# Patient Record
Sex: FEMALE | Race: WHITE | NOT HISPANIC OR LATINO | Employment: FULL TIME
[De-identification: names, ages, dates, MRNs, and addresses within clinical notes are randomized per-mention and may not be internally consistent; named-entity substitution may affect disease eponyms.]

---

## 2017-07-01 ENCOUNTER — HEALTH MAINTENANCE LETTER (OUTPATIENT)
Age: 37
End: 2017-07-01

## 2017-09-21 ENCOUNTER — OFFICE VISIT (OUTPATIENT)
Dept: URGENT CARE | Facility: RETAIL CLINIC | Age: 37
End: 2017-09-21
Payer: COMMERCIAL

## 2017-09-21 VITALS — SYSTOLIC BLOOD PRESSURE: 121 MMHG | HEART RATE: 76 BPM | DIASTOLIC BLOOD PRESSURE: 87 MMHG | TEMPERATURE: 98.7 F

## 2017-09-21 DIAGNOSIS — H66.001 ACUTE SUPPURATIVE OTITIS MEDIA OF RIGHT EAR WITHOUT SPONTANEOUS RUPTURE OF TYMPANIC MEMBRANE, RECURRENCE NOT SPECIFIED: Primary | ICD-10-CM

## 2017-09-21 PROCEDURE — 99213 OFFICE O/P EST LOW 20 MIN: CPT | Performed by: PHYSICIAN ASSISTANT

## 2017-09-21 NOTE — MR AVS SNAPSHOT
After Visit Summary   9/21/2017    Brittney Way    MRN: [id number]           Patient Information     Date Of Birth          1980        Visit Information        Provider Department      9/21/2017 5:10 PM Siobhan Gaona PA-C [facility]        Today's Diagnoses     Acute suppurative otitis media of right ear without spontaneous rupture of tympanic membrane, recurrence not specified    -  1      Care Instructions    Take antibiotic as directed- Augmentin twice daily for 10 days.  Take Tylenol or an NSAID such as ibuprofen or naproxen as needed for pain.  May take up to 1000 mg of Tylenol every 6-8 hours- do not exceed 4000 mg in 24 hours.  May take up to 600 mg ibuprofen every 6-8 hours.  Alternate Tylenol and Ibuprofen every 4 hours.  Warm compresses next to ear for pain relief.  Drink plenty of fluids and place a humidifier in bedroom.  Ear infections are not contagious.  Swimming is ok as long as there is no perforation in the ear drum.   Follow up with primary care provider in 10-14 days if any concern of persistent infection.          Follow-ups after your visit        Who to contact     You can reach your care team any time of the day by calling [phone].  Notification of test results:  If you have an abnormal lab result, we will notify you by phone as soon as possible.         Additional Information About Your Visit        MyChart Information     [employer] gives you secure access to your electronic health record. If you see a primary care provider, you can also send messages to your care team and make appointments. If you have questions, please call your primary care clinic.  If you do not have a primary care provider, please call [phone] and they will assist you.        Care EveryWhere ID     This is your Care EveryWhere ID. This could be used by other organizations to access your [place] medical records  [id number]        Your Vitals Were     Pulse  Temperature                76 98.7  F (37.1  C) (Oral)           Blood Pressure from Last 3 Encounters:   09/21/17 121/87   04/24/15 136/80   04/23/15 136/86    Weight from Last 3 Encounters:   04/24/15 179 lb (81.2 kg)   04/23/15 179 lb (81.2 kg)   07/16/12 175 lb (79.4 kg)              Today, you had the following     No orders found for display         Today's Medication Changes          These changes are accurate as of: 9/21/17  5:51 PM.  If you have any questions, ask your nurse or doctor.               Start taking these medicines.        Dose/Directions    amoxicillin-clavulanate 875-125 MG per tablet   Commonly known as:  AUGMENTIN   Used for:  Acute suppurative otitis media of right ear without spontaneous rupture of tympanic membrane, recurrence not specified        Dose:  1 tablet   Take 1 tablet by mouth 2 times daily for 10 days   Quantity:  20 tablet   Refills:  0            Where to get your medicines      These medications were sent to [id number] - [place], [place] - [address] [facility]  [address] [address] [facility] [address]     Phone:  [phone]     amoxicillin-clavulanate 875-125 MG per tablet                Primary Care Provider Office Phone # Fax #    Joseph IGNACIO Forte [phone]       [facility] [address] [place] [address]        Equal Access to Services     [facility] AH: Hadii aad ku hadasho Soomaali, waaxda luqadaha, qaybta kaalmada adeegyada, celia cyr. So [facility] [phone].    ATENCIÓN: Si habla español, tiene a oh disposición servicios gratuitos de asistencia lingüística. Sean al [phone].    We comply with applicable federal civil rights laws and [place] laws. We do not discriminate on the basis of race, color, national origin, age, disability sex, sexual orientation or gender identity.            Thank you!     Thank you for choosing [facility]  for your care. Our goal is always to  provide you with excellent care. Hearing back from our patients is one way we can continue to improve our services. Please take a few minutes to complete the written survey that you may receive in the mail after your visit with us. Thank you!             Your Updated Medication List - Protect others around you: Learn how to safely use, store and throw away your medicines at www.disposemymeds.org.          This list is accurate as of: 9/21/17  5:51 PM.  Always use your most recent med list.                   Brand Name Dispense Instructions for use Diagnosis    amoxicillin-clavulanate 875-125 MG per tablet    AUGMENTIN    20 tablet    Take 1 tablet by mouth 2 times daily for 10 days    Acute suppurative otitis media of right ear without spontaneous rupture of tympanic membrane, recurrence not specified       furosemide 20 MG tablet    LASIX    5 tablet    Take 1 tablet (20 mg) by mouth daily        IBUPROFEN PO      Take 800 mg by mouth every 6 hours as needed for moderate pain        TYLENOL PO

## 2017-09-21 NOTE — NURSING NOTE
"Chief Complaint   Patient presents with     Otalgia     Right; 3 days; painful; kept her up all night       Initial /87 (BP Location: Left arm)  Pulse 76  Temp 98.7  F (37.1  C) (Oral) Estimated body mass index is 31.72 kg/(m^2) as calculated from the following:    Height as of 4/23/15: 5' 2.99\" (1.6 m).    Weight as of 4/24/15: 179 lb (81.2 kg).  Medication Reconciliation: complete  "

## 2017-09-21 NOTE — PROGRESS NOTES
Chief Complaint   Patient presents with     Otalgia     Right; 3 days; painful; kept her up all night     SUBJECTIVE:  Brittney Way is a 37 year old female who presents for evaluation of right ear pain for 3 days.   Severity: moderate   Timing: gradual onset and worsening  Additional symptoms include pain kept her awake last night.  Treatment measures tried include: OTC meds- tylenol and ibuprofen not working for pain anymore  History of recurrent otitis: no  Predisposing factors include: None.    Past Medical History:   Diagnosis Date     NO ACTIVE PROBLEMS      Staph infection 2010    shoulder     Current Outpatient Prescriptions   Medication Sig Dispense Refill     Acetaminophen (TYLENOL PO)        amoxicillin-clavulanate (AUGMENTIN) 875-125 MG per tablet Take 1 tablet by mouth 2 times daily for 10 days 20 tablet 0     IBUPROFEN PO Take 800 mg by mouth every 6 hours as needed for moderate pain       furosemide (LASIX) 20 MG tablet Take 1 tablet (20 mg) by mouth daily (Patient not taking: Reported on 9/21/2017) 5 tablet 3     Social History   Substance Use Topics     Smoking status: Current Every Day Smoker     Packs/day: 0.50     Years: 16.00     Smokeless tobacco: Not on file     Alcohol use Yes      Comment: occasional     Allergies   Allergen Reactions     Erythromycin Nausea and Vomiting     Keflex [Cephalexin-Fd&C Yellow #6] Nausea and Vomiting     Morphine Itching     ROS:   Review of systems negative except as stated above.    OBJECTIVE:  /87 (BP Location: Left arm)  Pulse 76  Temp 98.7  F (37.1  C) (Oral)   GENERAL: awake alert and in no acute distress  EARS:   Right TM is minimally bulging with a mucopurulent effusion and erythema. Normal landmarks are not visible. Auditory canal without drainage, edema, erythema.  Left TM in neutral position, translucent without scarring, effusion or erythema. Normal landmarks are not visible. Auditory canal without drainage, edema, erythema.  ENT: EOMI,   PERRL, conjunctiva clear. Nares bilaterally without edematous turbinates or discharge. Posterior pharynx is not erythematous.  NECK: supple, non-tender to palpation, no adenopathy noted.   RESP: lungs clear to auscultation - no rales, rhonchi or wheezes  CV: regular rates and rhythm, normal S1 S2, no murmur noted    ASSESSMENT:    ICD-10-CM    1. Acute suppurative otitis media of right ear without spontaneous rupture of tympanic membrane, recurrence not specified H66.001 amoxicillin-clavulanate (AUGMENTIN) 875-125 MG per tablet     PLAN:   Patient Instructions   Take antibiotic as directed- Augmentin twice daily for 10 days.  Take Tylenol or an NSAID such as ibuprofen or naproxen as needed for pain.  May take up to 1000 mg of Tylenol every 6-8 hours- do not exceed 4000 mg in 24 hours.  May take up to 600 mg ibuprofen every 6-8 hours.  Alternate Tylenol and Ibuprofen every 4 hours.  Warm compresses next to ear for pain relief.  Drink plenty of fluids and place a humidifier in bedroom.  Ear infections are not contagious.  Swimming is ok as long as there is no perforation in the ear drum.   Follow up with primary care provider in 10-14 days if any concern of persistent infection.    Follow up with primary care provider with any problems, questions or concerns or if symptoms worsen or fail to improve. Patient agreed to plan and verbalized understanding.    Nurys Gaona PA-C  [facility]

## 2017-09-21 NOTE — PATIENT INSTRUCTIONS
Take antibiotic as directed- Augmentin twice daily for 10 days.  Take Tylenol or an NSAID such as ibuprofen or naproxen as needed for pain.  May take up to 1000 mg of Tylenol every 6-8 hours- do not exceed 4000 mg in 24 hours.  May take up to 600 mg ibuprofen every 6-8 hours.  Alternate Tylenol and Ibuprofen every 4 hours.  Warm compresses next to ear for pain relief.  Drink plenty of fluids and place a humidifier in bedroom.  Ear infections are not contagious.  Swimming is ok as long as there is no perforation in the ear drum.   Follow up with primary care provider in 10-14 days if any concern of persistent infection.

## 2017-12-04 ENCOUNTER — OFFICE VISIT (OUTPATIENT)
Dept: FAMILY MEDICINE | Facility: OTHER | Age: 37
End: 2017-12-04
Payer: COMMERCIAL

## 2017-12-04 VITALS
SYSTOLIC BLOOD PRESSURE: 134 MMHG | WEIGHT: 193 LBS | TEMPERATURE: 97.8 F | HEIGHT: 62 IN | HEART RATE: 88 BPM | RESPIRATION RATE: 12 BRPM | BODY MASS INDEX: 35.51 KG/M2 | DIASTOLIC BLOOD PRESSURE: 66 MMHG

## 2017-12-04 DIAGNOSIS — Z23 NEED FOR PROPHYLACTIC VACCINATION WITH TETANUS-DIPHTHERIA (TD): ICD-10-CM

## 2017-12-04 DIAGNOSIS — Z23 NEED FOR VACCINATION: ICD-10-CM

## 2017-12-04 DIAGNOSIS — M67.40 GANGLION CYST: Primary | ICD-10-CM

## 2017-12-04 PROCEDURE — 90471 IMMUNIZATION ADMIN: CPT | Performed by: PHYSICIAN ASSISTANT

## 2017-12-04 PROCEDURE — 90715 TDAP VACCINE 7 YRS/> IM: CPT | Performed by: PHYSICIAN ASSISTANT

## 2017-12-04 PROCEDURE — 99213 OFFICE O/P EST LOW 20 MIN: CPT | Mod: 25 | Performed by: PHYSICIAN ASSISTANT

## 2017-12-04 ASSESSMENT — PAIN SCALES - GENERAL: PAINLEVEL: MILD PAIN (2)

## 2017-12-04 NOTE — NURSING NOTE
Prior to injection verified patient identity using patient's name and date of birth.  Screening Questionnaire for Adult Immunization    Are you sick today?   No   Do you have allergies to medications, food, a vaccine component or latex?   Yes   Have you ever had a serious reaction after receiving a vaccination?   No   Do you have a long-term health problem with heart disease, lung disease, asthma, kidney disease, metabolic disease (e.g. diabetes), anemia, or other blood disorder?   No   Do you have cancer, leukemia, HIV/AIDS, or any other immune system problem?   No   In the past 3 months, have you taken medications that affect  your immune system, such as prednisone, other steroids, or anticancer drugs; drugs for the treatment of rheumatoid arthritis, Crohn s disease, or psoriasis; or have you had radiation treatments?   No   Have you had a seizure, or a brain or other nervous system problem?   No   During the past year, have you received a transfusion of blood or blood     products, or been given immune (gamma) globulin or antiviral drug?   No   For women: Are you pregnant or is there a chance you could become        pregnant during the next month?   No   Have you received any vaccinations in the past 4 weeks?   No     Immunization questionnaire was positive for at least one answer.  Notified provider ok to give.        Per orders of Valentine Song, injection of tdap given by Bhavana Hernandez. Patient instructed to remain in clinic for 15 minutes afterwards, and to report any adverse reaction to me immediately.       Screening performed by Bhavana Hernandez on 12/4/2017 at 4:16 PM.

## 2017-12-04 NOTE — MR AVS SNAPSHOT
After Visit Summary   12/4/2017    Brittney Way    MRN: [id number]           Patient Information     Date Of Birth          1980        Visit Information        Provider Department      12/4/2017 3:30 PM Valentine Song PA-C [facility]        Today's Diagnoses     Ganglion cyst    -  1    Need for prophylactic vaccination with tetanus-diphtheria (TD)           Follow-ups after your visit        Additional Services     ORTHO  REFERRAL       [facility] is referring you to the Orthopedic  Services at [facility].       The  Representative will assist you in the coordination of your Orthopedic and Musculoskeletal Care as prescribed by your physician.    The  Representative will call you within 1 business day to help schedule your appointment, or you may contact the  Representative at:    All areas ~ [phone]     Type of Referral : Surgical / Specialist       Timeframe requested: Routine    Coverage of these services is subject to the terms and limitations of your health insurance plan.  Please call member services at your health plan with any benefit or coverage questions.      If X-rays, CT or MRI's have been performed, please contact the facility where they were done to arrange for , prior to your scheduled appointment.  Please bring this referral request to your appointment and present it to your specialist.                  Who to contact     If you have questions or need follow up information about today's clinic visit or your schedule please contact [facility] directly at [phone].  Normal or non-critical lab and imaging results will be communicated to you by MyChart, letter or phone within 4 business days after the clinic has received the results. If you do not hear from us within 7 days, please contact the clinic through MyChart or phone. If you have a critical  "or abnormal lab result, we will notify you by phone as soon as possible.  Submit refill requests through [employer] or call your pharmacy and they will forward the refill request to us. Please allow 3 business days for your refill to be completed.          Additional Information About Your Visit        [employer] Information     [employer] gives you secure access to your electronic health record. If you see a primary care provider, you can also send messages to your care team and make appointments. If you have questions, please call your primary care clinic.  If you do not have a primary care provider, please call [phone] and they will assist you.        Care EveryWhere ID     This is your Care EveryWhere ID. This could be used by other organizations to access your [facility] medical records  [id number]        Your Vitals Were     Pulse Temperature Respirations Height BMI (Body Mass Index)       88 97.8  F (36.6  C) (Temporal) 12 5' 2.25\" (1.581 m) 35.02 kg/m2        Blood Pressure from Last 3 Encounters:   12/04/17 134/66   09/21/17 121/87   04/24/15 136/80    Weight from Last 3 Encounters:   12/04/17 193 lb (87.5 kg)   04/24/15 179 lb (81.2 kg)   04/23/15 179 lb (81.2 kg)              We Performed the Following     ORTHO  REFERRAL        Primary Care Provider Office Phone # Fax #    Joseph Forte [phone]       [facility] [address] [place] [address]        Equal Access to Services     [facility]IA AH: Hadii aad ku hadasho Soomaali, waaxda luqadaha, qaybta kaalmada adeegyada, celia abrams haypippa silva . So [facility] [phone].    ATENCIÓN: Si habla español, tiene a oh disposición servicios gratuitos de asistencia lingüística. Llame al [phone].    We comply with applicable federal civil rights laws and [place] laws. We do not discriminate on the basis of race, color, national origin, age, disability, sex, sexual orientation, or gender " identity.            Thank you!     Thank you for choosing [facility]  for your care. Our goal is always to provide you with excellent care. Hearing back from our patients is one way we can continue to improve our services. Please take a few minutes to complete the written survey that you may receive in the mail after your visit with us. Thank you!             Your Updated Medication List - Protect others around you: Learn how to safely use, store and throw away your medicines at www.disposemymeds.org.      Notice  As of 12/4/2017  3:48 PM    You have not been prescribed any medications.

## 2017-12-04 NOTE — NURSING NOTE
"Chief Complaint   Patient presents with     Derm Problem       Initial /66  Pulse 88  Temp 97.8  F (36.6  C) (Temporal)  Resp 12  Ht 5' 2.25\" (1.581 m)  Wt 193 lb (87.5 kg)  BMI 35.02 kg/m2 Estimated body mass index is 35.02 kg/(m^2) as calculated from the following:    Height as of this encounter: 5' 2.25\" (1.581 m).    Weight as of this encounter: 193 lb (87.5 kg).  Medication Reconciliation: complete     Bhavana Hernandez CMA (AAMA)    "

## 2017-12-04 NOTE — PROGRESS NOTES
"  SUBJECTIVE:   Brittney Way is a 37 year old female who presents to clinic today for the following health issues:      Concern - bump on palm  Onset: first noticed it a week ago, it is sore if it gets bumped or when driving and gripping the wheel    Description:   Right hand on palm by pointer finger    Intensity: 2/10    Progression of Symptoms:  same    Accompanying Signs & Symptoms:  Tender to the touch, not noticeable but can feel bump, it is hard, top of hand hurts by knuckle as well as on palm, no numbness or tingling, pt states that that finger is always cold and seems to be a different color than the others this just started when she noticed it.  It does not lock or cause numbness and tingling     Previous history of similar problem:   no    Precipitating factors:   Worsened by: bumping it    Alleviating factors:  Improved by: nothing    Therapies Tried and outcome: nothing      Problem list and histories reviewed & adjusted, as indicated.  Additional history: as documented    BP Readings from Last 3 Encounters:   12/04/17 134/66   09/21/17 121/87   04/24/15 136/80    Wt Readings from Last 3 Encounters:   12/04/17 193 lb (87.5 kg)   04/24/15 179 lb (81.2 kg)   04/23/15 179 lb (81.2 kg)            Labs reviewed in EPIC      Reviewed and updated as needed this visit by clinical staff       Reviewed and updated as needed this visit by Provider         ROS:  As documented above     OBJECTIVE:     /66  Pulse 88  Temp 97.8  F (36.6  C) (Temporal)  Resp 12  Ht 5' 2.25\" (1.581 m)  Wt 193 lb (87.5 kg)  BMI 35.02 kg/m2  Body mass index is 35.02 kg/(m^2).  GENERAL: healthy, alert and no distress  MS: no discoloration or difference in temperature of digits, sensation is intact to light touch in all digits, increased sensation is noted in right index finger, small round firm mass noted under skin, overlying skin is normal, it is only approx 2-3 mm in diameter     Diagnostic Test Results:  none "     ASSESSMENT/PLAN:         1. Ganglion cyst  Vs other cyst as ganglion cysts are typically not painful, will refer, offered referral to hand specialty she prefers to stay local   - ORTHO  REFERRAL    2. Need for prophylactic vaccination with tetanus-diphtheria (TD)  Updated     3. Need for vaccination    - TDAP VACCINE (ADACEL) [62121.002]  - 1st  Administration  [62154]    Follow up with ortho     Valentine Song PA-C  [facility]  Electronically signed by Valentine Song PA-C

## 2017-12-11 ENCOUNTER — OFFICE VISIT (OUTPATIENT)
Dept: ORTHOPEDICS | Facility: CLINIC | Age: 37
End: 2017-12-11
Payer: COMMERCIAL

## 2017-12-11 ENCOUNTER — RADIANT APPOINTMENT (OUTPATIENT)
Dept: GENERAL RADIOLOGY | Facility: CLINIC | Age: 37
End: 2017-12-11
Attending: ORTHOPAEDIC SURGERY
Payer: COMMERCIAL

## 2017-12-11 VITALS — TEMPERATURE: 98.6 F | HEIGHT: 62 IN | BODY MASS INDEX: 35.51 KG/M2 | WEIGHT: 193 LBS

## 2017-12-11 DIAGNOSIS — M79.644 PAIN OF FINGER OF RIGHT HAND: ICD-10-CM

## 2017-12-11 DIAGNOSIS — M67.441 GANGLION CYST OF FINGER OF RIGHT HAND: Primary | ICD-10-CM

## 2017-12-11 PROCEDURE — 73130 X-RAY EXAM OF HAND: CPT | Mod: TC

## 2017-12-11 PROCEDURE — 20612 ASPIRATE/INJ GANGLION CYST: CPT | Mod: F6 | Performed by: ORTHOPAEDIC SURGERY

## 2017-12-11 PROCEDURE — 99203 OFFICE O/P NEW LOW 30 MIN: CPT | Mod: 25 | Performed by: ORTHOPAEDIC SURGERY

## 2017-12-11 ASSESSMENT — PAIN SCALES - GENERAL: PAINLEVEL: MILD PAIN (2)

## 2017-12-11 NOTE — PROGRESS NOTES
Brittney Way is a 37 year old female who is seen in consultation at the request of Valentine Song PA-C  .  History of Present illness:  Brittney presents for evaluation of:  1.) rt hand palm cyst   2.)   Onset: early and December  Symptoms brought on by nothing.   Character:  sharp.    Progression of symptoms:  getting bigger  Previous similar pain: no .   Pain Level:  2/10.   Previous treatments:  nothing.  Currently on Blood thinners? no  Diagnosis of Diabetes? no

## 2017-12-11 NOTE — NURSING NOTE
"Chief Complaint   Patient presents with     Consult     cyt on rt hand per Valentine Song PA-C       Initial Temp 98.6  F (37  C)  Ht 1.581 m (5' 2.25\")  Wt 87.5 kg (193 lb)  BMI 35.02 kg/m2 Estimated body mass index is 35.02 kg/(m^2) as calculated from the following:    Height as of this encounter: 1.581 m (5' 2.25\").    Weight as of this encounter: 87.5 kg (193 lb).  Medication Reconciliation: complete    BP completed using cuff size: NA (Not Taken)    Madhuri Camarena MA      "

## 2017-12-11 NOTE — MR AVS SNAPSHOT
After Visit Summary   12/11/2017    Brittney Way    MRN: [id number]           Patient Information     Date Of Birth          1980        Visit Information        Provider Department      12/11/2017 4:00 PM Davina Becerra MD [facility]        Today's Diagnoses     Ganglion cyst of finger of right hand    -  1      Care Instructions    Encounter Diagnosis   Name Primary?     Ganglion cyst of finger of right hand Yes      Rest, ice and elevate above heart level as needed for pain control  1. Your xrays look great.  2. Your options are 1. Leave it alone.  2. Injection aspiration (trying to pull the gel like fluid out though a needle and then putting cortisone back into the area to help take away inflammation in the area).  3. Surgery.  3. Often times the cyst can come back after an injection aspiration, but sometimes it does not.    4. Even with surgery the cyst can come back but the odds of it not coming back are much greater.   5. If you have surgery you would be off work x 2 weeks.  6. We decided to do an injection aspiration today.  We will see how this works.    7. If it does not work well we can do surgery.  We could just have you call to schedule surgery since we talked about it today.    8. You can followup with Davina Becerra MD in 6 weeks, if you are having pain at that time we can do a cortisone injection.  You can always cancel the appointment if you are doing really well and follow-up as needed.   [employer] and [facility] may offer reliable information regarding your diagnosis and treatment plan.    THANK YOU for coming in today. If you receive a survey via [employer] or mail please let us know if there was anything you especially appreciated today or if there is any way we can improve our clinic. We appreciate your input.    GENERAL INFORMATION:  Our hours are:  Monday :     Clinic 7:30 AM-430 PM ([facility])  Tuesday:      Operating  Room All Day ([facility])  Wednesday: Clinic 7:30 AM - 11:15 AM ([facility])             Clinic 1:00 PM - 4:00PM ([facility])  Thursday:     Administrative Day  Friday:          Clinic 7:30 AM - 11:15 AM ([facility])            Clinic 1:00 PM - 4:00 PM ([facility])    We are not in the office Thursdays. Therefore non- urgent calls and medical messages received on Thursday will be addressed when we are back in the office on Wednesday. Urgent matters will be reviewed and addressed by one of our partners in the office as needed.    If lab work was done today as part of your evaluation you will generally be contacted via [facility], mail, or phone with the results within 1-5 days. If there is an alarming result we will contact you by phone. Lab results come back at varying times, I generally wait until all labs are resulted before making comments on results. Please note labs are automatically released to [facility] (if you have signed up for it) once available-at times you may see these prior to my having a chance to review them as well.    If you need refills please contact your pharmacist. They will send a refill request to me to review. Please allow 3 business days for us to process all refill requests. All narcotic refills should be handled in the clinic at the time of your visit.   Ganglion Cyst: Hand    A ganglion cyst is a firm, fluid-filled lump that can suddenly appear on the front or back of the wrist or at the base of a finger. These cysts grow from normal tissue in the wrist and fingers, and range in size from a pea to a peach pit. Although ganglion cysts are common, they don t spread, and they don t become cancerous. They can occur after an injury, but many times it isn t known why they grow. Ganglion cysts can change in size, and may go away on their own.  Symptoms  A ganglion cyst is sometimes painful,  especially when it first occurs. Constantly using your hand or wrist can make the cyst enlarge and hurt more. Some hand and wrist movements, such as grasping things, may also be difficult.  How a ganglion cyst develops  Your wrist and hand are made up of many small bones that meet at joints. Tendons attach muscles to the bones at the joints. The tendons allow the joints to bend and straighten. Both tendons and joints are lined with tissue called synovium. This tissue makes a thick fluid that keeps the joints and tendons moving easily. Sometimes the tissue balloons out from the joint or tendons and forms a cyst. As the cyst fills with fluid and grows, it appears as a lump you can feel.  Where ganglion cysts occur  A ganglion cyst can occur anywhere on the hand near a joint. Cysts most commonly appear on the back or palm side of the wrist, or on the palm at the base of a finger. Your doctor can usually diagnose a cyst by examining the lump. He or she may draw off a little fluid or order an X-ray to rule out other problems.  Treating a ganglion cyst  Your healthcare provider may just watch your ganglion cyst. Many shrink and become painless without treatment. Some disappear altogether. If the cyst is unsightly or painful, or makes it hard for you to use your hand, your healthcare provider can treat it or, if needed, remove it surgically.  Nonsurgical treatment  To shrink the cyst, your provider may remove (aspirate) the fluid with a needle. If the cyst hurts, your provider may also give you an injection of an anti-inflammatory, such as cortisone, to relieve the irritation. Your hand may then be wrapped to help keep the cyst from recurring.  Surgery  If the cyst reappears after treatment, your healthcare provider may remove it surgically. A section of the tissue that lines the joint or tendon is removed along with the cyst. This helps prevent another cyst from forming, although recurrence of the cyst is still possible  "after surgery. Usually, only your hand or arm is numbed, and you can go home a few hours after surgery. Your hand may be in a splint for several days.  Date Last Reviewed: 9/10/2015    8193-1803 [employer]. [address] [place] [address]. All rights reserved. This information is not intended as a substitute for professional medical care. Always follow your healthcare professional's instructions.              Follow-ups after your visit        Who to contact     If you have questions or need follow up information about today's clinic visit or your schedule please contact [facility] directly at [phone].  Normal or non-critical lab and imaging results will be communicated to you by [employer], letter or phone within 4 business days after the clinic has received the results. If you do not hear from us within 7 days, please contact the clinic through [email or web address] or phone. If you have a critical or abnormal lab result, we will notify you by phone as soon as possible.  Submit refill requests through [employer] or call your pharmacy and they will forward the refill request to us. Please allow 3 business days for your refill to be completed.          Additional Information About Your Visit        [employer] Information     [employer] gives you secure access to your electronic health record. If you see a primary care provider, you can also send messages to your care team and make appointments. If you have questions, please call your primary care clinic.  If you do not have a primary care provider, please call [phone] and they will assist you.        Care EveryWhere ID     This is your Care EveryWhere ID. This could be used by other organizations to access your [place] medical records  [id number]        Your Vitals Were     Temperature Height BMI (Body Mass Index)             98.6  F (37  C) 1.581 m (5' 2.25\") 35.02 kg/m2          Blood Pressure from Last 3 Encounters:   12/04/17 134/66 "   09/21/17 121/87   04/24/15 136/80    Weight from Last 3 Encounters:   12/11/17 87.5 kg (193 lb)   12/04/17 87.5 kg (193 lb)   04/24/15 81.2 kg (179 lb)               Primary Care Provider Office Phone # Fax #    Joseph Forte [phone]       [facility] [address] [place] [address]        Equal Access to Services     HILL SQUIRES : Hadii aad ku hadasho Soomaali, waaxda luqadaha, qaybta kaalmada adeegyada, waxay idiin hayaan adeeg kharash la'aan ah. So [facility] [phone].    ATENCIÓN: Si habla español, tiene a oh disposición servicios gratuitos de asistencia lingüística. [facility] [phone].    We comply with applicable federal civil rights laws and [place] laws. We do not discriminate on the basis of race, color, national origin, age, disability, sex, sexual orientation, or gender identity.            Thank you!     Thank you for choosing [facility]  for your care. Our goal is always to provide you with excellent care. Hearing back from our patients is one way we can continue to improve our services. Please take a few minutes to complete the written survey that you may receive in the mail after your visit with us. Thank you!             Your Updated Medication List - Protect others around you: Learn how to safely use, store and throw away your medicines at www.disposemymeds.org.      Notice  As of 12/11/2017  4:50 PM    You have not been prescribed any medications.

## 2017-12-11 NOTE — PATIENT INSTRUCTIONS
Encounter Diagnosis   Name Primary?     Ganglion cyst of finger of right hand Yes      Rest, ice and elevate above heart level as needed for pain control  1. Your xrays look great.  2. Your options are 1. Leave it alone.  2. Injection aspiration (trying to pull the gel like fluid out though a needle and then putting cortisone back into the area to help take away inflammation in the area).  3. Surgery.  3. Often times the cyst can come back after an injection aspiration, but sometimes it does not.    4. Even with surgery the cyst can come back but the odds of it not coming back are much greater.   5. If you have surgery you would be off work x 2 weeks.  6. We decided to do an injection aspiration today.  We will see how this works.    7. If it does not work well we can do surgery.  We could just have you call to schedule surgery since we talked about it today.  We have information in our note for our  so she can schedule it.    8. You can followup with Davina Becerra MD in 6 weeks, if you are having pain at that time we can do a cortisone injection.  You can always cancel the appointment if you are doing really well and follow-up as needed.   [employer] and [employer] may offer reliable information regarding your diagnosis and treatment plan.    THANK YOU for coming in today. If you receive a survey via [employer] or mail please let us know if there was anything you especially appreciated today or if there is any way we can improve our clinic. We appreciate your input.    GENERAL INFORMATION:  Our hours are:  Monday :     Clinic 7:30 AM-430 PM ([facility])  Tuesday:      Operating Room All Day ([facility])  Wednesday: Clinic 7:30 AM - 11:15 AM ([facility])             Clinic 1:00 PM - 4:00PM ([facility])  Thursday:     Administrative Day  Friday:          Clinic 7:30 AM - 11:15 AM ([facility]  [facility]            Clinic 1:00 PM - 4:00 PM ([facility])    We are not in the office Thursdays. Therefore non- urgent calls and medical messages received on Thursday will be addressed when we are back in the office on Wednesday. Urgent matters will be reviewed and addressed by one of our partners in the office as needed.    If lab work was done today as part of your evaluation you will generally be contacted via [employer], mail, or phone with the results within 1-5 days. If there is an alarming result we will contact you by phone. Lab results come back at varying times, I generally wait until all labs are resulted before making comments on results. Please note labs are automatically released to [employer] (if you have signed up for it) once available-at times you may see these prior to my having a chance to review them as well.    If you need refills please contact your pharmacist. They will send a refill request to me to review. Please allow 3 business days for us to process all refill requests. All narcotic refills should be handled in the clinic at the time of your visit.   Ganglion Cyst: Hand    A ganglion cyst is a firm, fluid-filled lump that can suddenly appear on the front or back of the wrist or at the base of a finger. These cysts grow from normal tissue in the wrist and fingers, and range in size from a pea to a peach pit. Although ganglion cysts are common, they don t spread, and they don t become cancerous. They can occur after an injury, but many times it isn t known why they grow. Ganglion cysts can change in size, and may go away on their own.  Symptoms  A ganglion cyst is sometimes painful, especially when it first occurs. Constantly using your hand or wrist can make the cyst enlarge and hurt more. Some hand and wrist movements, such as grasping things, may also be difficult.  How a ganglion cyst develops  Your wrist and hand are made up of many small bones that meet at joints. Tendons  attach muscles to the bones at the joints. The tendons allow the joints to bend and straighten. Both tendons and joints are lined with tissue called synovium. This tissue makes a thick fluid that keeps the joints and tendons moving easily. Sometimes the tissue balloons out from the joint or tendons and forms a cyst. As the cyst fills with fluid and grows, it appears as a lump you can feel.  Where ganglion cysts occur  A ganglion cyst can occur anywhere on the hand near a joint. Cysts most commonly appear on the back or palm side of the wrist, or on the palm at the base of a finger. Your doctor can usually diagnose a cyst by examining the lump. He or she may draw off a little fluid or order an X-ray to rule out other problems.  Treating a ganglion cyst  Your healthcare provider may just watch your ganglion cyst. Many shrink and become painless without treatment. Some disappear altogether. If the cyst is unsightly or painful, or makes it hard for you to use your hand, your healthcare provider can treat it or, if needed, remove it surgically.  Nonsurgical treatment  To shrink the cyst, your provider may remove (aspirate) the fluid with a needle. If the cyst hurts, your provider may also give you an injection of an anti-inflammatory, such as cortisone, to relieve the irritation. Your hand may then be wrapped to help keep the cyst from recurring.  Surgery  If the cyst reappears after treatment, your healthcare provider may remove it surgically. A section of the tissue that lines the joint or tendon is removed along with the cyst. This helps prevent another cyst from forming, although recurrence of the cyst is still possible after surgery. Usually, only your hand or arm is numbed, and you can go home a few hours after surgery. Your hand may be in a splint for several days.  Date Last Reviewed: 9/10/2015    6526-5416 [employer]. [address] [place] [address], [place] [address]. All rights reserved. This information  is not intended as a substitute for professional medical care. Always follow your healthcare professional's instructions.

## 2017-12-11 NOTE — LETTER
12/11/2017         RE: Brittney Way  [address]  [facility] [address]        Dear Colleague,    Thank you for referring your patient, Brittney Way, to the [facility]. Please see a copy of my visit note below.    Brittney Way is a 37 year old female who is seen in consultation at the request of Valentine Song PA-C  .  History of Present illness:  Brittney presents for evaluation of:  1.) rt hand palm cyst   2.)   Onset: early and December  Symptoms brought on by nothing.   Character:  sharp.    Progression of symptoms:  getting bigger  Previous similar pain: no .   Pain Level:  2/10.   Previous treatments:  nothing.  Currently on Blood thinners? no  Diagnosis of Diabetes? no            ORTHOPEDIC CONSULT      Chief Complaint: Brittney Way is a 37 year old right hand dominant female who works as a . She enjoys bringing her kids around to 4 pa[employer] and dirt bike races.    She is being seen for   Chief Complaints and History of Present Illnesses   Patient presents with     Consult     cyt on rt hand per Valentine Song PA-C         History of Present Illness:   Brittney Way is a 37 year old female who is seen in consultation at the request of Valentine Song PA-C    History of Present illness:  Brittney presents for evaluation of:  1.) rt hand palm cyst.. The system is on the polar side of the right hand at the base of the 2nd digit.  Onset: early and December. Patient has been dealing with this for approximately 2 weeks. She did not have any symptoms prior to this.  Symptoms brought on by nothing. There was no injury fall or trauma. Patient has never had surgery on this hand either.  Character:  sharp.    Progression of symptoms:  getting bigger.  Previous similar pain: no.   Pain Level:  2/10.   Previous treatments:  nothing.  Currently on Blood thinners? no  Diagnosis of Diabetes? no  Additional History: this is in an area of her hand is bumped often when she grabs  "a doorknob or uses the steering wheel or even uses her malice. So she is noticing the pain constantly.    Patient's past medical, surgical, social and family histories reviewed.     Past Medical History:   Diagnosis Date     NO ACTIVE PROBLEMS      Staph infection 2010    shoulder         Past Surgical History:   Procedure Laterality Date     ARTHROSCOPY SHOULDER DEBRIDEMENT  5/3/2011    Procedure:ARTHROSCOPY SHOULDER DEBRIDEMENT; irrigation and debridement, subacromial bursectomy; Surgeon:TIMOTHY LARSEN; Location:[facility] OR     essure procedure       Knee surgery x 3 on Left.       tonsillectomy and adenoidectomy         Medications:    No current outpatient prescriptions on file prior to visit.  No current facility-administered medications on file prior to visit.     Allergies   Allergen Reactions     Erythromycin Nausea and Vomiting     Keflex [Cephalexin-Fd&C Yellow #6] Nausea and Vomiting     Morphine Itching       Social History     Occupational History     Not on file.     Social History Main Topics     Smoking status: Current Every Day Smoker     Packs/day: 0.50     Years: 16.00     Types: Cigarettes     Smokeless tobacco: Never Used     Alcohol use Yes      Comment: occasional     Drug use: No     Sexual activity: Yes     Partners: Male       Family History   Problem Relation Age of Onset     CANCER Father      melanoma     Hypertension Mother      C.A.D. Maternal Grandfather      DIABETES Other      maternal greatgrandmother     Eye Disorder Paternal Grandmother      macular degeneration       REVIEW OF SYSTEMS  10 point review systems performed otherwise negative as noted as per history of present illness.    Physical Exam:  Vitals: Temp 98.6  F (37  C)  Ht 1.581 m (5' 2.25\")  Wt 87.5 kg (193 lb)  BMI 35.02 kg/m2  BMI= Body mass index is 35.02 kg/(m^2).    Constitutional: healthy, alert and no acute distress   Psychiatric: mentation appears normal and affect normal/bright  NEURO: no focal deficits, CMS " intact right upper extremity  RESP: Normal with easy respirations and no use of accessory muscles to breathe, no audible wheezing or retractions  CV: +2 radial pulse and her hand is warm to plapation, capillary refill less than 2 seconds on the 2nd digit.  SKIN: No erythema, rashes, excoriation, or breakdown. No evidence of infection.   MUSCULOSKELETAL:    INSPECTION of right hand: No gross deformities, erythema, edema, ecchymosis, atrophy or fasciculations.     PALPATION: tenderness to palpation at the base of the 2nd digit on the polar side of the hand. We are able to palpate a small cyst approximately half a centimeter in diameter by estimation. The system is movable and tender. There is no increased warmth. No other tenderness to palpation of the other digits hand or forearm.    ROM: full flexion and extension of the 2nd digit. No catching or locking noted with range of motion no pain with range of motion.     STRENGTH: 5 out of 5 strength with flexion and extension of the 2nd digit. No pain with strength testing.    SPECIAL TEST: none.  GAIT: non-antalgic  Lymph: no palpable lymph nodes    Diagnostic Modalities:  3 views of the right hand showing no fractional dislocation of tumor. We see no abnormalities of the right hand.  Independent visualization of the images was performed.    Impression: 1. Ganglion cyst right volar side at the base of the 2nd digit.    Plan:  All of the above pertinent physical exam and imaging modalities findings was reviewed with Brittney.      INJECTION PROCEDURE:  The patient was counseled about an aspiration and injection, including discussion of risks (including infection), contents of the procedure, rationale for performing the procedure, and expected benefits of the aspiration and injection. The patient was placed in the seated position. The skin was prepped with alcohol and betadine and then utilizing sterile technique an aspiration and injection of the right volar side of the hand  at the base of the 2nd digit was preformed. I used kassandra chloride spray prior to doing the aspiration. I injected 1 ml of 1% lidocaine then removed the needle.  After good anesthetic was achieved, I then used kassandra chloride again and put in an 18 gauge needed in the same location.   I moved the needle around to get all the fluid I could.  I aspirated 0 ml of fluid.  Then I removed the syringe but left the needle in the cyst.  I attached the pre drawn up cortisone injection.  The injection consisted 1ml of Kenalog (40mg per 1ml) with 3ml 1% lidocaine plain. The patient tolerated the aspiration and injection well, and there were no complications. The injection site was cleaned and covered with a Band-Aid. The injection was performed by Blake Hein PA-C      Patient Instructions:  1. Your xrays look great.  2. Your options are 1. Leave it alone.  2. Injection aspiration (trying to pull the gel like fluid out though a needle and then putting cortisone back into the area to help take away inflammation in the area).  3. Surgery.  3. Often times the cyst can come back after an injection aspiration, but sometimes it does not.    4. Even with surgery the cyst can come back but the odds of it not coming back are much greater.   5. If you have surgery you would be off work x 2 weeks.  6. We decided to do an injection aspiration today.  We will see how this works.    7. If it does not work well we can do surgery.  We could just have you call to schedule surgery since we talked about it today.  We have information in our note for our  so she can schedule it.    8. You can followup with Davina Becerra MD in 6 weeks, if you are having pain at that time we can do a cortisone injection.  You can always cancel the appointment if you are doing really well and follow-up as needed.   Re-x-ray on return: No    Scribed by Jaison Hein PA-C on 12/11/2017 at 4:41 PM, based on Dr. Davina Becerra's statements to me.    This  note was dictated with [employer].    EVANGELINA Caballero MD      Please schedule for surgery, pre op H&P, and post ops.      Patient Name:  Brittney Way ([id number]).  :  1980  Gender:  female  Patient Type:  Same Day Surgery  Surgeon:  Davina Becerra MD  Physician requests assist from:  PA    Procedures:    right hand ganglion cyst removal   Approach:  NA  Diagnosis:  Ganglion cyst of finger of right hand  C-arm:  No  Mini C-arm:   No  Pathology Scheduled:  Yes, we will send the cyst to pathology  Special instruments/supplies:  None, Small Open Surgery  Vendor Rep:  no  Anesthesia: General plus local  Block:  No   Block type: NA  Time needed:  30 minutes      Post op 1:           Again, thank you for allowing me to participate in the care of your patient.        Sincerely,        Davina Becerra MD

## 2017-12-11 NOTE — PROGRESS NOTES
ORTHOPEDIC CONSULT      Chief Complaint: Brittney Way is a 37 year old right hand dominant female who works as a . She enjoys bringing her kids around to [employer] and dirt bike races.    She is being seen for   Chief Complaints and History of Present Illnesses   Patient presents with     Consult     cyt on rt hand per Valentine Song PA-C         History of Present Illness:   Brittney Way is a 37 year old female who is seen in consultation at the request of Valentine Song PA-C    History of Present illness:  Brittney presents for evaluation of:  1.) rt hand palm cyst.. The system is on the polar side of the right hand at the base of the 2nd digit.  Onset: early and December. Patient has been dealing with this for approximately 2 weeks. She did not have any symptoms prior to this.  Symptoms brought on by nothing. There was no injury fall or trauma. Patient has never had surgery on this hand either.  Character:  sharp.    Progression of symptoms:  getting bigger.  Previous similar pain: no.   Pain Level:  2/10.   Previous treatments:  nothing.  Currently on Blood thinners? no  Diagnosis of Diabetes? no  Additional History: this is in an area of her hand is bumped often when she grabs a doorknob or uses the steering wheel or even uses her malice. So she is noticing the pain constantly.    Patient's past medical, surgical, social and family histories reviewed.     Past Medical History:   Diagnosis Date     NO ACTIVE PROBLEMS      Staph infection 2010    shoulder         Past Surgical History:   Procedure Laterality Date     ARTHROSCOPY SHOULDER DEBRIDEMENT  5/3/2011    Procedure:ARTHROSCOPY SHOULDER DEBRIDEMENT; irrigation and debridement, subacromial bursectomy; Surgeon:TIMOTHY LARSEN; Location:[facility] OR     essure procedure       Knee surgery x 3 on Left.       tonsillectomy and adenoidectomy         Medications:    No current outpatient prescriptions on file prior to visit.  No current  "facility-administered medications on file prior to visit.     Allergies   Allergen Reactions     Erythromycin Nausea and Vomiting     Keflex [Cephalexin-Fd&C Yellow #6] Nausea and Vomiting     Morphine Itching       Social History     Occupational History     Not on file.     Social History Main Topics     Smoking status: Current Every Day Smoker     Packs/day: 0.50     Years: 16.00     Types: Cigarettes     Smokeless tobacco: Never Used     Alcohol use Yes      Comment: occasional     Drug use: No     Sexual activity: Yes     Partners: Male       Family History   Problem Relation Age of Onset     CANCER Father      melanoma     Hypertension Mother      C.A.D. Maternal Grandfather      DIABETES Other      maternal greatgrandmother     Eye Disorder Paternal Grandmother      macular degeneration       REVIEW OF SYSTEMS  10 point review systems performed otherwise negative as noted as per history of present illness.    Physical Exam:  Vitals: Temp 98.6  F (37  C)  Ht 1.581 m (5' 2.25\")  Wt 87.5 kg (193 lb)  BMI 35.02 kg/m2  BMI= Body mass index is 35.02 kg/(m^2).    Constitutional: healthy, alert and no acute distress   Psychiatric: mentation appears normal and affect normal/bright  NEURO: no focal deficits, CMS intact right upper extremity  RESP: Normal with easy respirations and no use of accessory muscles to breathe, no audible wheezing or retractions  CV: +2 radial pulse and her hand is warm to plapation, capillary refill less than 2 seconds on the 2nd digit.  SKIN: No erythema, rashes, excoriation, or breakdown. No evidence of infection.   MUSCULOSKELETAL:    INSPECTION of right hand: No gross deformities, erythema, edema, ecchymosis, atrophy or fasciculations.     PALPATION: tenderness to palpation at the base of the 2nd digit on the polar side of the hand. We are able to palpate a small cyst approximately half a centimeter in diameter by estimation. The system is movable and tender. There is no increased " warmth. No other tenderness to palpation of the other digits hand or forearm.    ROM: full flexion and extension of the 2nd digit. No catching or locking noted with range of motion no pain with range of motion.     STRENGTH: 5 out of 5 strength with flexion and extension of the 2nd digit. No pain with strength testing.    SPECIAL TEST: none.  GAIT: non-antalgic  Lymph: no palpable lymph nodes    Diagnostic Modalities:  3 views of the right hand showing no fractional dislocation of tumor. We see no abnormalities of the right hand.  Independent visualization of the images was performed.    Impression: 1. Ganglion cyst right volar side at the base of the 2nd digit.    Plan:  All of the above pertinent physical exam and imaging modalities findings was reviewed with Brittney.      INJECTION PROCEDURE:  The patient was counseled about an aspiration and injection, including discussion of risks (including infection), contents of the procedure, rationale for performing the procedure, and expected benefits of the aspiration and injection. The patient was placed in the seated position. The skin was prepped with alcohol and betadine and then utilizing sterile technique an aspiration and injection of the right volar side of the hand at the base of the 2nd digit was preformed. I used kassandra chloride spray prior to doing the aspiration. I injected 1 ml of 1% lidocaine then removed the needle.  After good anesthetic was achieved, I then used kassandra chloride again and put in an 18 gauge needed in the same location.   I moved the needle around to get all the fluid I could.  I aspirated 0 ml of fluid.  Then I removed the syringe but left the needle in the cyst.  I attached the pre drawn up cortisone injection.  The injection consisted 1ml of Kenalog (40mg per 1ml) with 3ml 1% lidocaine plain. The patient tolerated the aspiration and injection well, and there were no complications. The injection site was cleaned and covered with a Band-Aid.  The injection was performed by Blake Hein PA-C      Patient Instructions:  1. Your xrays look great.  2. Your options are 1. Leave it alone.  2. Injection aspiration (trying to pull the gel like fluid out though a needle and then putting cortisone back into the area to help take away inflammation in the area).  3. Surgery.  3. Often times the cyst can come back after an injection aspiration, but sometimes it does not.    4. Even with surgery the cyst can come back but the odds of it not coming back are much greater.   5. If you have surgery you would be off work x 2 weeks.  6. We decided to do an injection aspiration today.  We will see how this works.    7. If it does not work well we can do surgery.  We could just have you call to schedule surgery since we talked about it today.  We have information in our note for our  so she can schedule it.    8. You can followup with Davina Becerra MD in 6 weeks, if you are having pain at that time we can do a cortisone injection.  You can always cancel the appointment if you are doing really well and follow-up as needed.   Re-x-ray on return: No    Scribed by Jaison Hein PA-C on 2017 at 4:41 PM, based on Dr. Davina Becerra's statements to me.    This note was dictated with [employer].    EVANGELINA Caballero MD      Please schedule for surgery, pre op H&P, and post ops.      Patient Name:  Brittney Way ([id number]).  :  1980  Gender:  female  Patient Type:  Same Day Surgery  Surgeon:  Davina Becerra MD  Physician requests assist from:  PA    Procedures:    right hand ganglion cyst removal   Approach:  NA  Diagnosis:  Ganglion cyst of finger of right hand  C-arm:  No  Mini C-arm:   No  Pathology Scheduled:  Yes, we will send the cyst to pathology  Special instruments/supplies:  None, Small Open Surgery  Vendor Rep:  no  Anesthesia: General plus local  Block:  No   Block type: NA  Time needed:  30  minutes      Post op 1:

## 2018-01-17 ENCOUNTER — TELEPHONE (OUTPATIENT)
Dept: FAMILY MEDICINE | Facility: OTHER | Age: 38
End: 2018-01-17

## 2018-01-17 NOTE — TELEPHONE ENCOUNTER
Panel Management Review      Patient has the following on her problem list: None      Composite cancer screening  Chart review shows that this patient is due/due soon for the following Pap Smear  Summary:    Patient is due/failing the following:   PAP    Action needed:   Patient needs office visit for PAP.    Type of outreach:    Sent letter.    Questions for provider review:    None                                                                                                                                    Caroline De Dios       Chart routed to Care Team .

## 2018-01-17 NOTE — LETTER
[facility]  [address] [place] [address]  Phone: [phone]  January 17, 2018      Brittney Way  [address] [address] [place] [id number]      Dear Brittney,    We care about your health and have reviewed your health plan including your medical conditions, medications, and lab results.  Based on this review, it is recommended that you follow up regarding the following health topic(s):  -Cervical Cancer Screening    We recommend you take the following action(s):  -schedule a PAP SMEAR EXAM which is due.  Please disregard this reminder if you have had this exam elsewhere within the last year.  It would be helpful for us to have a copy of your recent pap smear report to update your records.     Please call us at the [facility] - [phone] (or use [employer]) to address the above recommendations.     Thank you for trusting [facility]3] and we appreciate the opportunity to serve you.  We look forward to supporting your healthcare needs in the future.    Healthy Regards,    Your Health Care Team  [facility]

## 2020-06-15 ENCOUNTER — APPOINTMENT (OUTPATIENT)
Dept: ULTRASOUND IMAGING | Facility: CLINIC | Age: 40
End: 2020-06-15
Attending: EMERGENCY MEDICINE
Payer: COMMERCIAL

## 2020-06-15 ENCOUNTER — NURSE TRIAGE (OUTPATIENT)
Dept: NURSING | Facility: CLINIC | Age: 40
End: 2020-06-15

## 2020-06-15 ENCOUNTER — HOSPITAL ENCOUNTER (EMERGENCY)
Facility: CLINIC | Age: 40
Discharge: HOME | End: 2020-06-15
Attending: EMERGENCY MEDICINE | Admitting: EMERGENCY MEDICINE
Payer: COMMERCIAL

## 2020-06-15 VITALS
DIASTOLIC BLOOD PRESSURE: 81 MMHG | RESPIRATION RATE: 16 BRPM | OXYGEN SATURATION: 99 % | BODY MASS INDEX: 35.92 KG/M2 | TEMPERATURE: 98.1 F | SYSTOLIC BLOOD PRESSURE: 117 MMHG | WEIGHT: 198 LBS | HEART RATE: 71 BPM

## 2020-06-15 DIAGNOSIS — R19.7 DIARRHEA, UNSPECIFIED TYPE: ICD-10-CM

## 2020-06-15 DIAGNOSIS — R10.84 ABDOMINAL PAIN, GENERALIZED: ICD-10-CM

## 2020-06-15 LAB
ALBUMIN SERPL-MCNC: 4.2 G/DL (ref 3.4–5)
ALP SERPL-CCNC: 87 U/L (ref 40–150)
ALT SERPL W P-5'-P-CCNC: 32 U/L (ref 0–50)
ANION GAP SERPL CALCULATED.3IONS-SCNC: 6 MMOL/L (ref 3–14)
AST SERPL W P-5'-P-CCNC: 20 U/L (ref 0–45)
BASOPHILS # BLD AUTO: 0.1 10E9/L (ref 0–0.2)
BASOPHILS NFR BLD AUTO: 0.8 %
BILIRUB SERPL-MCNC: 0.5 MG/DL (ref 0.2–1.3)
BUN SERPL-MCNC: 19 MG/DL (ref 7–30)
CALCIUM SERPL-MCNC: 8.5 MG/DL (ref 8.5–10.1)
CHLORIDE SERPL-SCNC: 109 MMOL/L (ref 94–109)
CO2 SERPL-SCNC: 25 MMOL/L (ref 20–32)
CREAT SERPL-MCNC: 0.79 MG/DL (ref 0.52–1.04)
DIFFERENTIAL METHOD BLD: NORMAL
EOSINOPHIL NFR BLD AUTO: 5.9 %
ERYTHROCYTE [DISTWIDTH] IN BLOOD BY AUTOMATED COUNT: 12.2 % (ref 10–15)
GFR SERPL CREATININE-BSD FRML MDRD: >90 ML/MIN/{1.73_M2}
GLUCOSE SERPL-MCNC: 105 MG/DL (ref 70–99)
HCT VFR BLD AUTO: 45 % (ref 35–47)
HGB BLD-MCNC: 15.5 G/DL (ref 11.7–15.7)
IMM GRANULOCYTES # BLD: 0.1 10E9/L (ref 0–0.4)
IMM GRANULOCYTES NFR BLD: 0.7 %
LIPASE SERPL-CCNC: 73 U/L (ref 73–393)
LYMPHOCYTES # BLD AUTO: 2.9 10E9/L (ref 0.8–5.3)
LYMPHOCYTES NFR BLD AUTO: 32.9 %
MCH RBC QN AUTO: 31.4 PG (ref 26.5–33)
MCHC RBC AUTO-ENTMCNC: 34.4 G/DL (ref 31.5–36.5)
MCV RBC AUTO: 91 FL (ref 78–100)
MONOCYTES # BLD AUTO: 0.7 10E9/L (ref 0–1.3)
MONOCYTES NFR BLD AUTO: 7.6 %
NEUTROPHILS # BLD AUTO: 4.6 10E9/L (ref 1.6–8.3)
NEUTROPHILS NFR BLD AUTO: 52.1 %
NRBC # BLD AUTO: 0 10*3/UL
NRBC BLD AUTO-RTO: 0 /100
PLATELET # BLD AUTO: 286 10E9/L (ref 150–450)
POTASSIUM SERPL-SCNC: 3.7 MMOL/L (ref 3.4–5.3)
PROT SERPL-MCNC: 7.8 G/DL (ref 6.8–8.8)
RBC # BLD AUTO: 4.93 10E12/L (ref 3.8–5.2)
SODIUM SERPL-SCNC: 140 MMOL/L (ref 133–144)
WBC # BLD AUTO: 8.8 10E9/L (ref 4–11)

## 2020-06-15 PROCEDURE — 87506 IADNA-DNA/RNA PROBE TQ 6-11: CPT | Performed by: EMERGENCY MEDICINE

## 2020-06-15 PROCEDURE — 99284 EMERGENCY DEPT VISIT MOD MDM: CPT | Mod: 25 | Performed by: EMERGENCY MEDICINE

## 2020-06-15 PROCEDURE — 83690 ASSAY OF LIPASE: CPT | Performed by: EMERGENCY MEDICINE

## 2020-06-15 PROCEDURE — 85025 COMPLETE CBC W/AUTO DIFF WBC: CPT | Performed by: EMERGENCY MEDICINE

## 2020-06-15 PROCEDURE — 25000125 ZZHC RX 250: Performed by: EMERGENCY MEDICINE

## 2020-06-15 PROCEDURE — 96360 HYDRATION IV INFUSION INIT: CPT | Performed by: EMERGENCY MEDICINE

## 2020-06-15 PROCEDURE — 25800030 ZZH RX IP 258 OP 636: Performed by: EMERGENCY MEDICINE

## 2020-06-15 PROCEDURE — 87209 SMEAR COMPLEX STAIN: CPT | Performed by: EMERGENCY MEDICINE

## 2020-06-15 PROCEDURE — 99284 EMERGENCY DEPT VISIT MOD MDM: CPT | Mod: Z6 | Performed by: EMERGENCY MEDICINE

## 2020-06-15 PROCEDURE — 80053 COMPREHEN METABOLIC PANEL: CPT | Performed by: EMERGENCY MEDICINE

## 2020-06-15 PROCEDURE — 76705 ECHO EXAM OF ABDOMEN: CPT

## 2020-06-15 PROCEDURE — 25000132 ZZH RX MED GY IP 250 OP 250 PS 637: Performed by: EMERGENCY MEDICINE

## 2020-06-15 PROCEDURE — 87177 OVA AND PARASITES SMEARS: CPT | Performed by: EMERGENCY MEDICINE

## 2020-06-15 RX ORDER — SODIUM CHLORIDE 9 MG/ML
1000 INJECTION, SOLUTION INTRAVENOUS CONTINUOUS
Status: DISCONTINUED | OUTPATIENT
Start: 2020-06-15 | End: 2020-06-15 | Stop reason: HOSPADM

## 2020-06-15 RX ADMIN — SODIUM CHLORIDE 1000 ML: 9 INJECTION, SOLUTION INTRAVENOUS at 08:39

## 2020-06-15 RX ADMIN — LIDOCAINE HYDROCHLORIDE 30 ML: 20 SOLUTION ORAL; TOPICAL at 11:19

## 2020-06-15 ASSESSMENT — ENCOUNTER SYMPTOMS
RESPIRATORY NEGATIVE: 1
PSYCHIATRIC NEGATIVE: 1
EYES NEGATIVE: 1
APPETITE CHANGE: 1
RECTAL PAIN: 0
BLOOD IN STOOL: 0
NAUSEA: 0
ALLERGIC/IMMUNOLOGIC NEGATIVE: 1
CARDIOVASCULAR NEGATIVE: 1
DIARRHEA: 1
NEUROLOGICAL NEGATIVE: 1
ENDOCRINE NEGATIVE: 1
ABDOMINAL PAIN: 1
VOMITING: 0
MUSCULOSKELETAL NEGATIVE: 1
FEVER: 0

## 2020-06-15 NOTE — ED AVS SNAPSHOT
[facility] Emergency Department  [address] [place] [address]  Phone:  [phone]  Fax:  [phone]                                    Brittney Way   MRN: [id number]    Department:  [facility] Emergency Department   Date of Visit:  6/15/2020           After Visit Summary Signature Page    I have received my discharge instructions, and my questions have been answered. I have discussed any challenges I see with this plan with the nurse or doctor.    ..........................................................................................................................................  Patient/Patient Representative Signature      ..........................................................................................................................................  Patient Representative Print Name and Relationship to Patient    ..................................................               ................................................  Date                                   Time    ..........................................................................................................................................  Reviewed by Signature/Title    ...................................................              ..............................................  Date                                               Time          22EPIC Rev 08/18

## 2020-06-15 NOTE — DISCHARGE INSTRUCTIONS
Medical evaluation for abdominal pain along with diarrhea identified no abnormal findings for liver, gallbladder, pancreas.  Your symptoms are also not typical for upper gastrointestinal problems such as gastritis or peptic ulcers.  Concerned that this could represent a colitis.  We now need to determine if this is an infectious versus inflammatory colitis.  Monitor for fever, chills, night sweats.  Weight loss is common with this is you have already experienced.  Monitor for bloody mucus in your stool.  Please collect stool sample and bring it back to the hospital laboratory ASAP to test for infectious causes of colitis.  An appointment will be set up for you at the [facility].  Follow-up is important because if stool testing is inconclusive you might need CT imaging of the abdomen and pelvis along with colonoscopy.

## 2020-06-15 NOTE — TELEPHONE ENCOUNTER
Constant upper and right upper abdominal pain worsens when she eats.  Going on for ten days.  Then gets diarrhea that is like water.    Feels shaky today.  No fever  No vomiting.    I instructed ER.   Patient agreed.  I called [facility] with patient information.    COVID 19 Nurse Triage Plan/Patient Instructions    Please be aware that novel coronavirus (COVID-19) may be circulating in the community. If you develop symptoms such as fever, cough, or SOB or if you have concerns about the presence of another infection including coronavirus (COVID-19), please contact your health care provider or visit [email or web address].     Disposition/Instructions    Patient to go to ED and follow protocol based instructions.     Bring Your Own Device:  Please also bring your smart device(s) (smart phones, tablets, laptops) and their charging cables for your personal use and to communicate with your care team during your visit.    Thank you for taking steps to prevent the spread of this virus.  o Limit your contact with others.  o Wear a simple mask to cover your cough.  o Wash your hands well and often.    Resources    [facility]: About COVID-19: [email or web address]    CDC: What to Do If You're Sick: www.cdc.gov/coronavirus/2019-ncov/about/steps-when-sick.html    CDC: Ending Home Isolation: www.cdc.gov/coronavirus/2019-ncov/hcp/disposition-in-home-patients.html     CDC: Caring for Someone: www.cdc.gov/coronavirus/2019-ncov/if-you-are-sick/care-for-someone.html     [facility]: Interim Guidance for Hospital Discharge to Home: www.health.[facility].[place]/diseases/coronavirus/hcp/hospdischarge.pdf    [facility] clinical trials (COVID-19 research studies): clinicalaffairs.[facility]/um-clinical-trials     Below are the COVID-19 hotlines at the [place] [facility] ([facility]). Interpreters are available.   o For health questions: Call 009-147-6330 or 1-536.901.3256 (7 a.m. to 7 p.m.)  o For questions about schools  and childcare: Call 871-602-7808 or 1-377.595.6326 (7 a.m. to 7 p.m.)       Reason for Disposition    SEVERE abdominal pain (e.g., excruciating)    Additional Information    Negative: Passed out (i.e., fainted, collapsed and was not responding)    Negative: Shock suspected (e.g., cold/pale/clammy skin, too weak to stand, low BP, rapid pulse)    Negative: Visible sweat on face or sweat is dripping down    Protocols used: ABDOMINAL PAIN - UPPER-A-[place]    Leslie SILVA RN [facility]

## 2020-06-16 ENCOUNTER — APPOINTMENT (OUTPATIENT)
Dept: CT IMAGING | Facility: CLINIC | Age: 40
End: 2020-06-16
Attending: EMERGENCY MEDICINE
Payer: COMMERCIAL

## 2020-06-16 ENCOUNTER — HOSPITAL ENCOUNTER (EMERGENCY)
Facility: CLINIC | Age: 40
Discharge: HOME | End: 2020-06-16
Attending: EMERGENCY MEDICINE | Admitting: EMERGENCY MEDICINE
Payer: COMMERCIAL

## 2020-06-16 VITALS
SYSTOLIC BLOOD PRESSURE: 121 MMHG | OXYGEN SATURATION: 97 % | BODY MASS INDEX: 36.16 KG/M2 | TEMPERATURE: 98.8 F | RESPIRATION RATE: 18 BRPM | HEART RATE: 91 BPM | WEIGHT: 199.3 LBS | DIASTOLIC BLOOD PRESSURE: 93 MMHG

## 2020-06-16 DIAGNOSIS — R19.7 DIARRHEA, UNSPECIFIED TYPE: ICD-10-CM

## 2020-06-16 LAB
ALBUMIN SERPL-MCNC: 4 G/DL (ref 3.4–5)
ALP SERPL-CCNC: 84 U/L (ref 40–150)
ALT SERPL W P-5'-P-CCNC: 29 U/L (ref 0–50)
ANION GAP SERPL CALCULATED.3IONS-SCNC: 6 MMOL/L (ref 3–14)
AST SERPL W P-5'-P-CCNC: 20 U/L (ref 0–45)
BASOPHILS # BLD AUTO: 0.1 10E9/L (ref 0–0.2)
BASOPHILS NFR BLD AUTO: 0.7 %
BILIRUB SERPL-MCNC: 0.3 MG/DL (ref 0.2–1.3)
BUN SERPL-MCNC: 12 MG/DL (ref 7–30)
C COLI+JEJUNI+LARI FUSA STL QL NAA+PROBE: NOT DETECTED
CALCIUM SERPL-MCNC: 8.1 MG/DL (ref 8.5–10.1)
CHLORIDE SERPL-SCNC: 112 MMOL/L (ref 94–109)
CO2 SERPL-SCNC: 22 MMOL/L (ref 20–32)
CREAT SERPL-MCNC: 0.67 MG/DL (ref 0.52–1.04)
CRP SERPL-MCNC: 3.5 MG/L (ref 0–8)
DIFFERENTIAL METHOD BLD: NORMAL
EC STX1 GENE STL QL NAA+PROBE: NOT DETECTED
EC STX2 GENE STL QL NAA+PROBE: NOT DETECTED
ENTERIC PATHOGEN COMMENT: NORMAL
EOSINOPHIL NFR BLD AUTO: 5.7 %
ERYTHROCYTE [DISTWIDTH] IN BLOOD BY AUTOMATED COUNT: 12 % (ref 10–15)
ERYTHROCYTE [SEDIMENTATION RATE] IN BLOOD BY WESTERGREN METHOD: 7 MM/H (ref 0–20)
GFR SERPL CREATININE-BSD FRML MDRD: >90 ML/MIN/{1.73_M2}
GLUCOSE SERPL-MCNC: 91 MG/DL (ref 70–99)
HCT VFR BLD AUTO: 43.6 % (ref 35–47)
HGB BLD-MCNC: 14.6 G/DL (ref 11.7–15.7)
IMM GRANULOCYTES # BLD: 0 10E9/L (ref 0–0.4)
IMM GRANULOCYTES NFR BLD: 0.5 %
LIPASE SERPL-CCNC: 78 U/L (ref 73–393)
LYMPHOCYTES # BLD AUTO: 3.5 10E9/L (ref 0.8–5.3)
LYMPHOCYTES NFR BLD AUTO: 41.7 %
MCH RBC QN AUTO: 30.9 PG (ref 26.5–33)
MCHC RBC AUTO-ENTMCNC: 33.5 G/DL (ref 31.5–36.5)
MCV RBC AUTO: 92 FL (ref 78–100)
MONOCYTES # BLD AUTO: 0.7 10E9/L (ref 0–1.3)
MONOCYTES NFR BLD AUTO: 8.5 %
NEUTROPHILS # BLD AUTO: 3.6 10E9/L (ref 1.6–8.3)
NEUTROPHILS NFR BLD AUTO: 42.9 %
NOROV GI+II ORF1-ORF2 JNC STL QL NAA+PR: NOT DETECTED
NRBC # BLD AUTO: 0 10*3/UL
NRBC BLD AUTO-RTO: 0 /100
O+P STL MICRO: NORMAL
O+P STL MICRO: NORMAL
PLATELET # BLD AUTO: 267 10E9/L (ref 150–450)
POTASSIUM SERPL-SCNC: 3.6 MMOL/L (ref 3.4–5.3)
PROT SERPL-MCNC: 7.5 G/DL (ref 6.8–8.8)
RBC # BLD AUTO: 4.72 10E12/L (ref 3.8–5.2)
RVA NSP5 STL QL NAA+PROBE: NOT DETECTED
SALMONELLA SP RPOD STL QL NAA+PROBE: NOT DETECTED
SHIGELLA SP+EIEC IPAH STL QL NAA+PROBE: NOT DETECTED
SODIUM SERPL-SCNC: 140 MMOL/L (ref 133–144)
SPECIMEN SOURCE: NORMAL
V CHOL+PARA RFBL+TRKH+TNAA STL QL NAA+PR: NOT DETECTED
WBC # BLD AUTO: 8.3 10E9/L (ref 4–11)
Y ENTERO RECN STL QL NAA+PROBE: NOT DETECTED

## 2020-06-16 PROCEDURE — 25000128 H RX IP 250 OP 636: Performed by: EMERGENCY MEDICINE

## 2020-06-16 PROCEDURE — 99285 EMERGENCY DEPT VISIT HI MDM: CPT | Mod: 25 | Performed by: EMERGENCY MEDICINE

## 2020-06-16 PROCEDURE — 96374 THER/PROPH/DIAG INJ IV PUSH: CPT | Mod: 59 | Performed by: EMERGENCY MEDICINE

## 2020-06-16 PROCEDURE — 85652 RBC SED RATE AUTOMATED: CPT | Performed by: EMERGENCY MEDICINE

## 2020-06-16 PROCEDURE — 25800030 ZZH RX IP 258 OP 636: Performed by: EMERGENCY MEDICINE

## 2020-06-16 PROCEDURE — 86140 C-REACTIVE PROTEIN: CPT | Performed by: EMERGENCY MEDICINE

## 2020-06-16 PROCEDURE — 25000132 ZZH RX MED GY IP 250 OP 250 PS 637: Performed by: EMERGENCY MEDICINE

## 2020-06-16 PROCEDURE — 83690 ASSAY OF LIPASE: CPT | Performed by: EMERGENCY MEDICINE

## 2020-06-16 PROCEDURE — 74177 CT ABD & PELVIS W/CONTRAST: CPT

## 2020-06-16 PROCEDURE — 80053 COMPREHEN METABOLIC PANEL: CPT | Performed by: EMERGENCY MEDICINE

## 2020-06-16 PROCEDURE — 85025 COMPLETE CBC W/AUTO DIFF WBC: CPT | Performed by: EMERGENCY MEDICINE

## 2020-06-16 PROCEDURE — 99284 EMERGENCY DEPT VISIT MOD MDM: CPT | Mod: Z6 | Performed by: EMERGENCY MEDICINE

## 2020-06-16 RX ORDER — SODIUM CHLORIDE 9 MG/ML
1000 INJECTION, SOLUTION INTRAVENOUS CONTINUOUS
Status: DISCONTINUED | OUTPATIENT
Start: 2020-06-16 | End: 2020-06-16 | Stop reason: HOSPADM

## 2020-06-16 RX ORDER — KETOROLAC TROMETHAMINE 30 MG/ML
30 INJECTION, SOLUTION INTRAMUSCULAR; INTRAVENOUS ONCE
Status: COMPLETED | OUTPATIENT
Start: 2020-06-16 | End: 2020-06-16

## 2020-06-16 RX ORDER — DIPHENOXYLATE HCL/ATROPINE 2.5-.025MG
2 TABLET ORAL ONCE
Status: COMPLETED | OUTPATIENT
Start: 2020-06-16 | End: 2020-06-16

## 2020-06-16 RX ORDER — IOPAMIDOL 755 MG/ML
500 INJECTION, SOLUTION INTRAVASCULAR ONCE
Status: COMPLETED | OUTPATIENT
Start: 2020-06-16 | End: 2020-06-16

## 2020-06-16 RX ADMIN — DIPHENOXYLATE HYDROCHLORIDE AND ATROPINE SULFATE 2 TABLET: 2.5; .025 TABLET ORAL at 17:44

## 2020-06-16 RX ADMIN — KETOROLAC TROMETHAMINE 30 MG: 30 INJECTION, SOLUTION INTRAMUSCULAR at 17:47

## 2020-06-16 RX ADMIN — SODIUM CHLORIDE 1000 ML: 9 INJECTION, SOLUTION INTRAVENOUS at 17:44

## 2020-06-16 RX ADMIN — IOPAMIDOL 98 ML: 755 INJECTION, SOLUTION INTRAVENOUS at 18:11

## 2020-06-16 NOTE — DISCHARGE INSTRUCTIONS
Additional work-up in the emergency department today shows no abnormal CT findings of than just liquid stool in the large and small bowel.    Recommend collecting additional stool sample for Clostridium difficile  infection screening.    Schedule colonoscopy as an outpatient with clinic follow-up to go over results    Use Imodium which can be purchased over-the-counter to help reduce frequency of loose stools.  Goal is to reduce by 50% not fully eliminate.    If you are getting my rectal irritation for the diarrhea you may use Tucks wipes and 1% hydrocortisone cream.    Order has been placed for outpatient gastroenterology consultation.  They should be calling you for appointment.    Stay well-hydrated by drinking Gatorade.

## 2020-06-16 NOTE — RESULT ENCOUNTER NOTE
Final Enteric Bacteria and Virus Panel by [facility] Stool is NEGATIVE for Campylobacter group, Salmonella species, Shigella species, Shiga toxin 1 gene, Shiga toxin 2 gene, Vibrio group,  Yersinia enterocolitica,  Rotavirus A,  and Norovirus I and II.    No treatment or change in treatment per [facility] Lab Result protocol.

## 2020-06-16 NOTE — ED AVS SNAPSHOT
[facility] Emergency Department  [address] [place] [address]  Phone:  [phone]  Fax:  [phone]                                    Brittney Way   MRN: [id number]    Department:  [facility] Emergency Department   Date of Visit:  6/16/2020           After Visit Summary Signature Page    I have received my discharge instructions, and my questions have been answered. I have discussed any challenges I see with this plan with the nurse or doctor.    ..........................................................................................................................................  Patient/Patient Representative Signature      ..........................................................................................................................................  Patient Representative Print Name and Relationship to Patient    ..................................................               ................................................  Date                                   Time    ..........................................................................................................................................  Reviewed by Signature/Title    ...................................................              ..............................................  Date                                               Time          22EPIC Rev 08/18

## 2020-06-16 NOTE — ED PROVIDER NOTES
History     Chief Complaint   Patient presents with     Diarrhea     HPI  Brittney Way is a 39 year old female who I returns for follow-up of ongoing abdominal discomfort with diarrhea.  I spoke with her earlier today and encouraged that she come back for reassessment.  She was seen initially yesterday.  She is had 10 days of loose stools.  At times is epigastric other times it is more broad-based and has experiencing cramping throughout the entire abdomen.  She has had nausea without emesis.  Food at times does provoke symptoms.  She is not having typical postprandial colic.  Her ultrasound right upper quadrant today was normal.  No history for pancreatitis or liver problems.  She does not drink.  She is not been having dyspeptic symptoms with belching and burping.  Stools are watery like averaging 8 to 10/day.  No travel.  She has not been on any recent antibiotics.  She has had no fever, chills, night sweats, weight loss, blood in stool.    Work-up yesterday included a normal CBC, CMP, right upper quadrant ultrasound.  At the time of discharge she left a stool sample which also was negative for enteric pathogens and ova and parasite.  We did not screen for C. difficile toxin B.    When I spoke with her earlier today she was describing ongoing abdominal cramping.  10 loose stools per day.  She was worried about dehydration and decreased urine output.  I encouraged her to return to the ED for reassessment, symptom control, rehydration through IV and consideration for CT imaging of the abdomen pelvis with contrast.    Allergies:  Allergies   Allergen Reactions     Erythromycin Nausea and Vomiting     Keflex [Cephalexin-Fd&C Yellow #6] Nausea and Vomiting     Morphine Itching       Problem List:    Patient Active Problem List    Diagnosis Date Noted     Septic arthritis (H) 05/03/2011     Priority: Medium        Past Medical History:    Past Medical History:   Diagnosis Date     NO ACTIVE PROBLEMS      Mackenzie  infection 2010       Past Surgical History:    Past Surgical History:   Procedure Laterality Date     ARTHROSCOPY SHOULDER DEBRIDEMENT  5/3/2011    Procedure:ARTHROSCOPY SHOULDER DEBRIDEMENT; irrigation and debridement, subacromial bursectomy; Surgeon:TIMOTHY LARSEN; Location:[facility]     essure procedure       Knee surgery x 3 on Left.       tonsillectomy and adenoidectomy         Family History:    Family History   Problem Relation Age of Onset     Cancer Father         melanoma     Hypertension Mother      C.A.D. Maternal Grandfather      Diabetes Other         maternal greatgrandmother     Eye Disorder Paternal Grandmother         macular degeneration       Social History:  Marital Status:  Single [1]  Social History     Tobacco Use     Smoking status: Current Every Day Smoker     Packs/day: 0.50     Years: 16.00     Pack years: 8.00     Types: Cigarettes     Smokeless tobacco: Never Used   Substance Use Topics     Alcohol use: Yes     Comment: occasional     Drug use: No        Medications:    No current outpatient medications on file.        Review of Systems   All other systems reviewed and are negative.      Physical Exam   BP: (!) 121/93  Pulse: 91  Temp: 98.8  F (37.1  C)  Resp: 18  Weight: 90.4 kg (199 lb 4.8 oz)  SpO2: 97 %      Physical Exam  Vitals signs and nursing note reviewed.   Constitutional:       Appearance: She is obese. She is not ill-appearing or toxic-appearing.   HENT:      Nose: No rhinorrhea.   Eyes:      Pupils: Pupils are equal, round, and reactive to light.   Cardiovascular:      Rate and Rhythm: Normal rate.      Pulses: Normal pulses.   Pulmonary:      Effort: Pulmonary effort is normal.   Abdominal:      Comments: Obese.  Slightly bloated.  Bowel sounds are present.  She has generalized discomfort with a little bit of localization epigastric area.  There is no guarding or rebound.  No CVA tenderness   Skin:     General: Skin is dry.      Coloration: Skin is not jaundiced.    Psychiatric:         Mood and Affect: Mood normal.         ED Course        Procedures                   Results for orders placed or performed during the hospital encounter of 06/16/20 (from the past 24 hour(s))   CBC with platelets differential   Result Value Ref Range    WBC 8.3 4.0 - 11.0 10e9/L    RBC Count 4.72 3.8 - 5.2 10e12/L    Hemoglobin 14.6 11.7 - 15.7 g/dL    Hematocrit 43.6 35.0 - 47.0 %    MCV 92 78 - 100 fl    MCH 30.9 26.5 - 33.0 pg    MCHC 33.5 31.5 - 36.5 g/dL    RDW 12.0 10.0 - 15.0 %    Platelet Count 267 150 - 450 10e9/L    Diff Method Automated Method     % Neutrophils 42.9 %    % Lymphocytes 41.7 %    % Monocytes 8.5 %    % Eosinophils 5.7 %    % Basophils 0.7 %    % Immature Granulocytes 0.5 %    Nucleated RBCs 0 0 /100    Absolute Neutrophil 3.6 1.6 - 8.3 10e9/L    Absolute Lymphocytes 3.5 0.8 - 5.3 10e9/L    Absolute Monocytes 0.7 0.0 - 1.3 10e9/L    Absolute Basophils 0.1 0.0 - 0.2 10e9/L    Abs Immature Granulocytes 0.0 0 - 0.4 10e9/L    Absolute Nucleated RBC 0.0    CRP inflammation   Result Value Ref Range    CRP Inflammation 3.5 0.0 - 8.0 mg/L   Erythrocyte sedimentation rate auto   Result Value Ref Range    Sed Rate 7 0 - 20 mm/h   Comprehensive metabolic panel   Result Value Ref Range    Sodium 140 133 - 144 mmol/L    Potassium 3.6 3.4 - 5.3 mmol/L    Chloride 112 (H) 94 - 109 mmol/L    Carbon Dioxide 22 20 - 32 mmol/L    Anion Gap 6 3 - 14 mmol/L    Glucose 91 70 - 99 mg/dL    Urea Nitrogen 12 7 - 30 mg/dL    Creatinine 0.67 0.52 - 1.04 mg/dL    GFR Estimate >90 >60 mL/min/[1.73_m2]    GFR Estimate If Black >90 >60 mL/min/[1.73_m2]    Calcium 8.1 (L) 8.5 - 10.1 mg/dL    Bilirubin Total 0.3 0.2 - 1.3 mg/dL    Albumin 4.0 3.4 - 5.0 g/dL    Protein Total 7.5 6.8 - 8.8 g/dL    Alkaline Phosphatase 84 40 - 150 U/L    ALT 29 0 - 50 U/L    AST 20 0 - 45 U/L   Lipase   Result Value Ref Range    Lipase 78 73 - 393 U/L   CT Abdomen Pelvis w Contrast    Narrative    CT ABDOMEN AND PELVIS  WITH CONTRAST 6/16/2020 6:14 PM    CLINICAL HISTORY: Severe abdominal pain with diarrhea.    TECHNIQUE: CT scan of the abdomen and pelvis was performed following  injection of IV contrast. Multiplanar reformats were obtained. Dose  reduction techniques were used.  CONTRAST: Isovue 370, 98mL    COMPARISON: None.    FINDINGS:   LOWER CHEST: Normal.    HEPATOBILIARY: Normal.    PANCREAS: Normal.    SPLEEN: Normal.    ADRENAL GLANDS: Normal.    KIDNEYS/BLADDER: Patchy small linear areas of hypoenhancement at the  left kidney noted. No hydronephrosis or urolithiasis. Unremarkable  right kidney for any acute abnormality.    BOWEL: Small fluid within a few small and large bowel loops. No acute  inflammatory change of the bowel identified. No obstruction. Normal  appendix. No abscess or free air.    PELVIC ORGANS: No acute abnormality. Bilateral fallopian occlusion  devices noted.    ADDITIONAL FINDINGS: None.    MUSCULOSKELETAL: Normal.      Impression    IMPRESSION:   1.  Small linear areas of hypoenhancement at the left kidney could  relate to pyelonephritis and clinical correlation is recommended.  2.  No other acute abnormality is seen.       Medications   0.9% sodium chloride BOLUS (1,000 mLs Intravenous New Bag 6/16/20 1744)     Followed by   sodium chloride 0.9% infusion (has no administration in time range)   sodium chloride (PF) 0.9% PF flush 100 mL (has no administration in time range)   sodium chloride (PF) 0.9% PF flush 3 mL (has no administration in time range)   iopamidol (ISOVUE-370) solution 500 mL (has no administration in time range)   diphenoxylate-atropine (LOMOTIL) 2.5-0.025 MG per tablet 2 tablet (2 tablets Oral Given 6/16/20 1744)   ketorolac (TORADOL) injection 30 mg (30 mg Intravenous Given 6/16/20 1747)       Assessments & Plan (with Medical Decision Making)  Problem list:   10-day history for diarrhea  Associate with abdominal pain  Infectious work-up includes no leukocytosis or fever.  Stool for  enteric pathogens was negative.  No acute inflammatory markers with no leukocytosis and normal CRP, sed rate  Clinically no history to suggest food borne allergy/intolerance.    Plan: With this being her second ED visit we still do not have a  clear etiology for her diarrhea and abdominal pain recommending CT of the abdomen with IV and oral contrast.  This does not show any areas of inflammation will need to get GI involved for consideration of colonoscopy.  Second stool sample be sent for C. difficile toxin B.    6:34 PM  No clinical correlation for pyelonephritis as per the CT abdomen report.  Schedule outpatient GI consultation possible colonoscopy     I have reviewed the nursing notes.    I have reviewed the findings, diagnosis, plan and need for follow up with the patient.      New Prescriptions    No medications on file       Final diagnoses:   Diarrhea, unspecified type       6/16/2020   [facility] EMERGENCY DEPARTMENT     Seamus Bhatia,   06/16/20 9495

## 2020-06-17 DIAGNOSIS — R19.7 DIARRHEA, UNSPECIFIED TYPE: ICD-10-CM

## 2020-06-17 LAB
C DIFF TOX B STL QL: NEGATIVE
SPECIMEN SOURCE: NORMAL

## 2020-06-17 PROCEDURE — 87493 C DIFF AMPLIFIED PROBE: CPT | Performed by: EMERGENCY MEDICINE

## 2020-06-17 NOTE — RESULT ENCOUNTER NOTE
Final Clostridium Difficile toxin B PCR is NEGATIVE.    No treatment or change in treatment per [place] ED Lab Result protocol.

## 2020-06-22 ENCOUNTER — PRE VISIT (OUTPATIENT)
Dept: GASTROENTEROLOGY | Facility: CLINIC | Age: 40
End: 2020-06-22

## 2020-06-22 ENCOUNTER — VIRTUAL VISIT (OUTPATIENT)
Dept: GASTROENTEROLOGY | Facility: CLINIC | Age: 40
End: 2020-06-22
Attending: EMERGENCY MEDICINE
Payer: COMMERCIAL

## 2020-06-22 ENCOUNTER — TELEPHONE (OUTPATIENT)
Dept: GASTROENTEROLOGY | Facility: CLINIC | Age: 40
End: 2020-06-22

## 2020-06-22 DIAGNOSIS — R19.7 DIARRHEA, UNSPECIFIED TYPE: ICD-10-CM

## 2020-06-22 NOTE — LETTER
6/22/2020     RE: Brittney Way  [address] [facility] [address]    Dear Colleague,    Thank you for referring your patient, Brittney Way, to the [facility]. Please see a copy of my visit note below.    Brittney Way is a 39 year old female who is being evaluated via a billable video visit.      Video-Visit Details  Type of service:  Video Visit  Video Start Time: 9:01 AM  Video End Time: 9:27 AM  Originating Location (pt. Location): Other car  Distant Location (provider location):  [facility]   Platform used for Video Visit: Angelina Jackson PA-C      GI CLINIC VISIT    CC/REFERRING MD:  Seamus Bhatia  REASON FOR CONSULTATION: loose stools    ASSESSMENT/PLAN:  39-year-old female with no significant past medical history other than tobacco use who presents to the GI clinic for consultation regarding loose stools and abdominal pain.    1.  Abdominal pain and loose stools: Infectious studies have been completed to include enteric bacteria, ova and parasite and C. difficile infection, all of which have been negative.  CT scan unremarkable for any specific bowel inflammation or indications for reasoning behind abdominal pain and loose stools.  I do note that there was a consideration for pyelonephritis and a UA should be considered.  Given symptoms have been ongoing and quite significant for 2 weeks now, it is certainly reasonable to proceed with an endoscopic assessment with random biopsies to assess for inflammatory bowel disease, microscopic colitis and celiac sprue.  Certainly there could be a possibility of an alternative presentation of COVID (although no respiratory symptoms), we certainly have seen GI manifestations of this virus.  Given patient will be proceeding with an endoscopic assessment, this will require COVID test.  Certainly would need to consider functional causes if the above is unremarkable.  Pending the results  of the endoscopic assessment we will proceed with additional testing if necessary.  --Consider UA  --COVID test prior to endoscopic assessment  --Colonoscopy and upper endoscopy to be completed with random biopsies  --At this time may continue Imodium up to 8 tablets/day    2. Colorectal cancer screening: No personal or family history of colorectal cancer.  Patient will proceed with a colonoscopy then we would determine her risk and surveillance.    [facility] 4 weeks     Thank you for this consultation.  It was a pleasure to participate in the care of this patient; please contact us with any further questions.       Mikey Jackson PA-C  Division of Gastroenterology, Hepatology and Nutrition  [facility]      HPI  39-year-old female with no significant past medical history other than tobacco use who presents to the GI clinic for consultation regarding loose stools and abdominal pain.  Patient states she woke up on June 6, 2020 with loose stools, [place] 7 and since this time has continued to have ongoing multiple loose stools daily.  When this first started, she was having on average 10 stools per day but then started to take Imodium to see if this allowed for improvement and then had an average of 5 liquid stools per day.  She finds that if she does not eat anything, this will decrease her stool count to about 2/day.  She denies any blood in the stool, however early on she noted that the stool was almost black, stool is now mustard color.  Abdominal pain is described as a constant dull pain from the sternum down to her right side which is always present.  When she eats she can feel things move through and will often prompt an urgent bowel movement.  No nocturnal stools.  When she wakes up in the morning the abdominal pain is across her abdomen just below her breasts.  This is when the pain is at its worst, then will slowly decrease during the day but remain on her right side.  She denies any nausea or vomiting.   She had tried to take Tylenol which she feels has been slightly helpful, taking on average thousand milligrams a couple times per week.  She has also tried ibuprofen a couple times but stopped this as she felt that it was not helping, may even be making it worse.  When she did take ibuprofen it was about 600 mg at a time.  Prior to this episode she would take ibuprofen 1-2 times per week at 600 mg each time.  She has had fatigue and lack of energy throughout this time.    Baseline bowel pattern is 1 formed bowel movement every 3 days, feeling fully evacuated.  No blood in the stool.    Symptoms prompted an emergency department visit on 6/16/2020.  CBC within normal limits.  Enteric bacteria, ova and parasite and C. difficile studies have been negative.  LFTs within normal limits.  Creatinine normal.  Lites normal other than a slightly low chloride at 112.  Lipase within normal limits.    CT abdomen and pelvis 6/16/2020 shows small linear areas of hypoenhancement to the left kidney could relate to pyelonephritis and clinical correlation is recommended.  No other acute abnormality is seen.    Abdominal ultrasound 6/15/2020 is normal.    ROS:    No fevers or chills  + weight loss (13 lbs)  No blurry vision, double vision or change in vision  No sore throat  No lymphadenopathy  + headache  No paraesthesias, or weakness in a limb  + shortness of breath (with pain in the morning)   No chest pain or pressure  No arthralgias or myalgias  No rashes or skin changes  No odynophagia or dysphagia  No BRBPR, hematochezia, melena  No dysuria, frequency or urgency  No hot/cold intolerance or polyria  No anxiety or depression    PROBLEM LIST  Patient Active Problem List    Diagnosis Date Noted     Septic arthritis (H) 05/03/2011     Priority: Medium       PERTINENT PAST MEDICAL HISTORY:  Past Medical History:   Diagnosis Date     NO ACTIVE PROBLEMS      Staph infection 2010    shoulder       PREVIOUS SURGERIES:  Past Surgical  History:   Procedure Laterality Date     ARTHROSCOPY SHOULDER DEBRIDEMENT  5/3/2011    Procedure:ARTHROSCOPY SHOULDER DEBRIDEMENT; irrigation and debridement, subacromial bursectomy; Surgeon:TIMOTHY LARSEN; Location:[facility] OR     essure procedure       Knee surgery x 3 on Left.       tonsillectomy and adenoidectomy         PREVIOUS ENDOSCOPY:  None    ALLERGIES:     Allergies   Allergen Reactions     Erythromycin Nausea and Vomiting     Keflex [Cephalexin-Fd&C Yellow #6] Nausea and Vomiting     Morphine Itching       PERTINENT MEDICATIONS:  No current outpatient medications on file.    SOCIAL HISTORY:  Currently working and is a  for [employer] ([employer] job)  Tobacco use: 1/2ppd  ETOH: a couple drinks per month  Social History     Socioeconomic History     Marital status: Single     Spouse name: Not on file     Number of children: Not on file     Years of education: Not on file     Highest education level: Not on file   Occupational History     Not on file   Social Needs     Financial resource strain: Not on file     Food insecurity     Worry: Not on file     Inability: Not on file     Transportation needs     Medical: Not on file     Non-medical: Not on file   Tobacco Use     Smoking status: Current Every Day Smoker     Packs/day: 0.50     Years: 16.00     Pack years: 8.00     Types: Cigarettes     Smokeless tobacco: Never Used   Substance and Sexual Activity     Alcohol use: Yes     Comment: occasional     Drug use: No     Sexual activity: Yes     Partners: Male   Lifestyle     Physical activity     Days per week: Not on file     Minutes per session: Not on file     Stress: Not on file   Relationships     Social connections     Talks on phone: Not on file     Gets together: Not on file     Attends Christian service: Not on file     Active member of club or organization: Not on file     Attends meetings of clubs or organizations: Not on file     Relationship status: Not on file     Intimate  partner violence     Fear of current or ex partner: Not on file     Emotionally abused: Not on file     Physically abused: Not on file     Forced sexual activity: Not on file   Other Topics Concern     Parent/sibling w/ CABG, MI or angioplasty before 65F 55M? Not Asked   Social History Narrative     Not on file       FAMILY HISTORY:  FH of CRC: None  FH of IBD: None  Family History   Problem Relation Age of Onset     Cancer Father         melanoma     Hypertension Mother      C.A.D. Maternal Grandfather      Diabetes Other         maternal greatgrandmother     Eye Disorder Paternal Grandmother         macular degeneration       Past/family/social history reviewed and no changes    PHYSICAL EXAMINATION:  Constitutional: aaox3, cooperative, pleasant, not dyspneic/diaphoretic, no acute distress  Vitals reviewed: There were no vitals taken for this visit.  Wt:   Wt Readings from Last 2 Encounters:   06/16/20 90.4 kg (199 lb 4.8 oz)   06/15/20 89.8 kg (198 lb)      General appearance:  Healthy appearing adult, in no acute distress  Eyes: Sclera anicteric, Pupils round and reactive to light  Ears, nose, mouth and throat: No obvious external lesions of ears and nose, Hearing intact  Neck: Symmetric, No obvious external lesions  Respiratory: Normal respiration, no use of accessory muscles   Skin: No rashes or jaundice   Psychiatric: Oriented to person, place and time, Appropriate mood and affect.     PERTINENT STUDIES:    Orders Only on 06/17/2020   Component Date Value Ref Range Status     Specimen Description 06/17/2020 Feces   Final     C Diff Toxin B PCR 06/17/2020 Negative  NEG^Negative Final     Again, thank you for allowing me to participate in the care of your patient.      Sincerely,      Mikey Jackson PA-C

## 2020-06-22 NOTE — PROGRESS NOTES
"Brittney Way is a 39 year old female who is being evaluated via a billable video visit.      The patient has been notified of following:     \"This video visit will be conducted via a call between you and your physician/provider. We have found that certain health care needs can be provided without the need for an in-person physical exam.  This service lets us provide the care you need with a video conversation.  If a prescription is necessary we can send it directly to your pharmacy.  If lab work is needed we can place an order for that and you can then stop by our lab to have the test done at a later time.    Video visits are billed at different rates depending on your insurance coverage.  Please reach out to your insurance provider with any questions.    If during the course of the call the physician/provider feels a video visit is not appropriate, you will not be charged for this service.\"    Patient has given verbal consent for Video visit? Yes    Will anyone else be joining your video visit? No        Video-Visit Details    Type of service:  Video Visit    Video Start Time: 9:01 AM  Video End Time: 9:27 AM    Originating Location (pt. Location): Other car    Distant Location (provider location):  [facility]     Platform used for Video Visit: Angelina Jackson PA-C    GI CLINIC VISIT    CC/REFERRING MD:  Seamus Bhatia  REASON FOR CONSULTATION: loose stools    ASSESSMENT/PLAN:  39-year-old female with no significant past medical history other than tobacco use who presents to the GI clinic for consultation regarding loose stools and abdominal pain.    1.  Abdominal pain and loose stools: Infectious studies have been completed to include enteric bacteria, ova and parasite and C. difficile infection, all of which have been negative.  CT scan unremarkable for any specific bowel inflammation or indications for reasoning behind abdominal pain and loose stools.  I do note " that there was a consideration for pyelonephritis and a UA should be considered.  Given symptoms have been ongoing and quite significant for 2 weeks now, it is certainly reasonable to proceed with an endoscopic assessment with random biopsies to assess for inflammatory bowel disease, microscopic colitis and celiac sprue.  Certainly there could be a possibility of an alternative presentation of COVID (although no respiratory symptoms), we certainly have seen GI manifestations of this virus.  Given patient will be proceeding with an endoscopic assessment, this will require COVID test.  Certainly would need to consider functional causes if the above is unremarkable.  Pending the results of the endoscopic assessment we will proceed with additional testing if necessary.  --Consider UA  --COVID test prior to endoscopic assessment  --Colonoscopy and upper endoscopy to be completed with random biopsies  --At this time may continue Imodium up to 8 tablets/day    2. Colorectal cancer screening: No personal or family history of colorectal cancer.  Patient will proceed with a colonoscopy then we would determine her risk and surveillance.    RTC 4 weeks     Thank you for this consultation.  It was a pleasure to participate in the care of this patient; please contact us with any further questions.       Mikey Jackson PA-C  Division of Gastroenterology, Hepatology and Nutrition  [facility]      HPI  39-year-old female with no significant past medical history other than tobacco use who presents to the GI clinic for consultation regarding loose stools and abdominal pain.  Patient states she woke up on June 6, 2020 with loose stools, [place] 7 and since this time has continued to have ongoing multiple loose stools daily.  When this first started, she was having on average 10 stools per day but then started to take Imodium to see if this allowed for improvement and then had an average of 5 liquid stools per day.  She finds  that if she does not eat anything, this will decrease her stool count to about 2/day.  She denies any blood in the stool, however early on she noted that the stool was almost black, stool is now mustard color.  Abdominal pain is described as a constant dull pain from the sternum down to her right side which is always present.  When she eats she can feel things move through and will often prompt an urgent bowel movement.  No nocturnal stools.  When she wakes up in the morning the abdominal pain is across her abdomen just below her breasts.  This is when the pain is at its worst, then will slowly decrease during the day but remain on her right side.  She denies any nausea or vomiting.  She had tried to take Tylenol which she feels has been slightly helpful, taking on average thousand milligrams a couple times per week.  She has also tried ibuprofen a couple times but stopped this as she felt that it was not helping, may even be making it worse.  When she did take ibuprofen it was about 600 mg at a time.  Prior to this episode she would take ibuprofen 1-2 times per week at 600 mg each time.  She has had fatigue and lack of energy throughout this time.    Baseline bowel pattern is 1 formed bowel movement every 3 days, feeling fully evacuated.  No blood in the stool.    Symptoms prompted an emergency department visit on 6/16/2020.  CBC within normal limits.  Enteric bacteria, ova and parasite and C. difficile studies have been negative.  LFTs within normal limits.  Creatinine normal.  Lites normal other than a slightly low chloride at 112.  Lipase within normal limits.    CT abdomen and pelvis 6/16/2020 shows small linear areas of hypoenhancement to the left kidney could relate to pyelonephritis and clinical correlation is recommended.  No other acute abnormality is seen.    Abdominal ultrasound 6/15/2020 is normal.    ROS:    No fevers or chills  + weight loss (13 lbs)  No blurry vision, double vision or change in  vision  No sore throat  No lymphadenopathy  + headache  No paraesthesias, or weakness in a limb  + shortness of breath (with pain in the morning)   No chest pain or pressure  No arthralgias or myalgias  No rashes or skin changes  No odynophagia or dysphagia  No BRBPR, hematochezia, melena  No dysuria, frequency or urgency  No hot/cold intolerance or polyria  No anxiety or depression    PROBLEM LIST  Patient Active Problem List    Diagnosis Date Noted     Septic arthritis (H) 05/03/2011     Priority: Medium       PERTINENT PAST MEDICAL HISTORY:  Past Medical History:   Diagnosis Date     NO ACTIVE PROBLEMS      Staph infection 2010    shoulder       PREVIOUS SURGERIES:  Past Surgical History:   Procedure Laterality Date     ARTHROSCOPY SHOULDER DEBRIDEMENT  5/3/2011    Procedure:ARTHROSCOPY SHOULDER DEBRIDEMENT; irrigation and debridement, subacromial bursectomy; Surgeon:TIMOTHY LARSEN; Location:[facility] OR     essure procedure       Knee surgery x 3 on Left.       tonsillectomy and adenoidectomy         PREVIOUS ENDOSCOPY:  None    ALLERGIES:     Allergies   Allergen Reactions     Erythromycin Nausea and Vomiting     Keflex [Cephalexin-Fd&C Yellow #6] Nausea and Vomiting     Morphine Itching       PERTINENT MEDICATIONS:  No current outpatient medications on file.    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)  Tobacco use: 1/2ppd  ETOH: a couple drinks per month  Social History     Socioeconomic History     Marital status: Single     Spouse name: Not on file     Number of children: Not on file     Years of education: Not on file     Highest education level: Not on file   Occupational History     Not on file   Social Needs     Financial resource strain: Not on file     Food insecurity     Worry: Not on file     Inability: Not on file     Transportation needs     Medical: Not on file     Non-medical: Not on file   Tobacco Use     Smoking status: Current Every Day Smoker      Packs/day: 0.50     Years: 16.00     Pack years: 8.00     Types: Cigarettes     Smokeless tobacco: Never Used   Substance and Sexual Activity     Alcohol use: Yes     Comment: occasional     Drug use: No     Sexual activity: Yes     Partners: Male   Lifestyle     Physical activity     Days per week: Not on file     Minutes per session: Not on file     Stress: Not on file   Relationships     Social connections     Talks on phone: Not on file     Gets together: Not on file     Attends Congregation service: Not on file     Active member of club or organization: Not on file     Attends meetings of clubs or organizations: Not on file     Relationship status: Not on file     Intimate partner violence     Fear of current or ex partner: Not on file     Emotionally abused: Not on file     Physically abused: Not on file     Forced sexual activity: Not on file   Other Topics Concern     Parent/sibling w/ CABG, MI or angioplasty before 65F 55M? Not Asked   Social History Narrative     Not on file       FAMILY HISTORY:  FH of CRC: None  FH of IBD: None  Family History   Problem Relation Age of Onset     Cancer Father         melanoma     Hypertension Mother      C.A.D. Maternal Grandfather      Diabetes Other         maternal greatgrandmother     Eye Disorder Paternal Grandmother         macular degeneration       Past/family/social history reviewed and no changes    PHYSICAL EXAMINATION:  Constitutional: aaox3, cooperative, pleasant, not dyspneic/diaphoretic, no acute distress  Vitals reviewed: There were no vitals taken for this visit.  Wt:   Wt Readings from Last 2 Encounters:   06/16/20 90.4 kg (199 lb 4.8 oz)   06/15/20 89.8 kg (198 lb)      General appearance:  Healthy appearing adult, in no acute distress  Eyes: Sclera anicteric, Pupils round and reactive to light  Ears, nose, mouth and throat: No obvious external lesions of ears and nose, Hearing intact  Neck: Symmetric, No obvious external lesions  Respiratory: Normal  respiration, no use of accessory muscles   Skin: No rashes or jaundice   Psychiatric: Oriented to person, place and time, Appropriate mood and affect.     PERTINENT STUDIES:    Orders Only on 06/17/2020   Component Date Value Ref Range Status     Specimen Description 06/17/2020 Feces   Final     C Diff Toxin B PCR 06/17/2020 Negative  NEG^Negative Final

## 2020-06-22 NOTE — NURSING NOTE
Chief Complaint   Patient presents with     Consult     New consult       There were no vitals filed for this visit.    There is no height or weight on file to calculate BMI.    Camille Stoddard, CMA

## 2020-06-22 NOTE — PATIENT INSTRUCTIONS
It was a pleasure taking care of you today.  I've included a brief summary of our discussion and care plan from today's visit below.  Please review this information with your primary care provider.  ______________________________________________________________________    My recommendations are summarized as follows:    -- Colonoscopy and upper endoscopy to be scheduled  -- May continue imodium at this time (up to 8 tablets daily)    Return to GI Clinic in 4 weeks to review your progress.    ______________________________________________________________________    Who do I call with any questions after my visit?  Please be in touch if there are any further questions that arise following today's visit.  There are multiple ways to contact your gastroenterology care team.        During business hours, you may reach a Gastroenterology nurse at [phone], option 3.       To schedule or reschedule an appointment, please call [phone].       You can always send a secure message through [employer].  [employer] messages are answered by your nurse or doctor typically within 24 hours.  Please allow extra time on weekends and holidays.        For urgent/emergent questions after business hours, you may reach the on-call GI Fellow by contacting the [facility]  at [phone].      In order for your refill to be processed in a timely fashion, it is your responsibility to ensure you follow the recommendations from your provider regarding your laboratory studies and follow up appointments.       How will I get the results of any tests ordered?    You will receive all of your results.  If you have signed up for [employer], any tests ordered at your visit will be available to you after your physician reviews them.  Typically this takes 1-2 weeks.  If there are urgent results that require a change in your care plan, your physician or nurse will call you to discuss the next steps.      What is [employer]?  [employer] is a  secure way for you to access all of your healthcare records from the [facility].  It is a web based computer program, so you can sign on to it from any location.  It also allows you to send secure messages to your care team.  I recommend signing up for [employer] access if you have not already done so and are comfortable with using a computer.      How to I schedule a follow-up visit?  If you did not schedule a follow-up visit today, please call [phone] to schedule a follow-up office visit.        Sincerely,    Mikey Jackson PA-C  [facility]  Division of Gastroenterology

## 2020-09-30 ENCOUNTER — TELEPHONE (OUTPATIENT)
Dept: GASTROENTEROLOGY | Facility: CLINIC | Age: 40
End: 2020-09-30

## 2020-11-08 ENCOUNTER — HEALTH MAINTENANCE LETTER (OUTPATIENT)
Age: 40
End: 2020-11-08

## 2021-09-11 ENCOUNTER — HEALTH MAINTENANCE LETTER (OUTPATIENT)
Age: 41
End: 2021-09-11

## 2022-01-01 ENCOUNTER — HEALTH MAINTENANCE LETTER (OUTPATIENT)
Age: 42
End: 2022-01-01

## 2022-06-07 ENCOUNTER — TRANSCRIBE ORDERS (OUTPATIENT)
Dept: OTHER | Age: 42
End: 2022-06-07
Payer: COMMERCIAL

## 2022-06-07 DIAGNOSIS — K30 DELAYED GASTRIC EMPTYING: ICD-10-CM

## 2022-06-07 DIAGNOSIS — R14.0 ABDOMINAL BLOATING: Primary | ICD-10-CM

## 2022-06-22 NOTE — ED PROVIDER NOTES
History     Chief Complaint   Patient presents with     Abdominal Pain     HPI  Brittney Way is a 39 year old female who presents with right upper quadrant abdominal pain.  Present for 10 days.  Constant but worsened after eating.  All types of food provokes symptoms.  Denies fever, chills.  Has noted no jaundice.  Having frequent loose stools 8 to 10/day that are watery like.  She is noted no blood or mucus in her stool.  Denies alcohol use.  No previous history for pancreatitis.  No known history for cholelithiasis.  No risk factors for infectious colitis.       Allergies:  Allergies   Allergen Reactions     Erythromycin Nausea and Vomiting     Keflex [Cephalexin-Fd&C Yellow #6] Nausea and Vomiting     Morphine Itching       Problem List:    Patient Active Problem List    Diagnosis Date Noted     Septic arthritis (H) 05/03/2011     Priority: Medium        Past Medical History:    Past Medical History:   Diagnosis Date     NO ACTIVE PROBLEMS      Staph infection 2010       Past Surgical History:    Past Surgical History:   Procedure Laterality Date     ARTHROSCOPY SHOULDER DEBRIDEMENT  5/3/2011    Procedure:ARTHROSCOPY SHOULDER DEBRIDEMENT; irrigation and debridement, subacromial bursectomy; Surgeon:TIMOTHY LARSEN; Location:[facility] OR     essure procedure       Knee surgery x 3 on Left.       tonsillectomy and adenoidectomy         Family History:    Family History   Problem Relation Age of Onset     Cancer Father         melanoma     Hypertension Mother      C.A.D. Maternal Grandfather      Diabetes Other         maternal greatgrandmother     Eye Disorder Paternal Grandmother         macular degeneration       Social History:  Marital Status:  Single [1]  Social History     Tobacco Use     Smoking status: Current Every Day Smoker     Packs/day: 0.50     Years: 16.00     Pack years: 8.00     Types: Cigarettes     Smokeless tobacco: Never Used   Substance Use Topics     Alcohol use: Yes     Comment: occasional      Drug use: No        Medications:    No current outpatient medications on file.        Review of Systems   Constitutional: Positive for appetite change. Negative for fever.   HENT: Negative.    Eyes: Negative.    Respiratory: Negative.    Cardiovascular: Negative.    Gastrointestinal: Positive for abdominal pain and diarrhea. Negative for blood in stool, nausea, rectal pain and vomiting.   Endocrine: Negative.    Genitourinary: Negative.    Musculoskeletal: Negative.    Skin: Negative.    Allergic/Immunologic: Negative.    Neurological: Negative.    Psychiatric/Behavioral: Negative.    All other systems reviewed and are negative.      Physical Exam   BP: (!) 129/99  Pulse: 106  Temp: 98.1  F (36.7  C)  Resp: 16  Weight: 89.8 kg (198 lb)  SpO2: 95 %      Physical Exam  Vitals signs and nursing note reviewed.   Constitutional:       Appearance: She is obese. She is not ill-appearing.   HENT:      Head: Normocephalic.      Mouth/Throat:      Mouth: Mucous membranes are moist.   Eyes:      General: No scleral icterus.     Pupils: Pupils are equal, round, and reactive to light.   Cardiovascular:      Rate and Rhythm: Tachycardia present.      Heart sounds: Normal heart sounds. No murmur.   Pulmonary:      Effort: Pulmonary effort is normal.      Breath sounds: No wheezing or rales.   Abdominal:      General: There is no distension.      Comments: Discomfort present right upper quadrant extending into the midepigastric area.  Mild guarding.  Negative Qiu sign.  No discomfort over McBurney's point.  The remainder the abdomen is nontender.  She had no guarding or rebound.  No palpable mass or hernia.   Skin:     General: Skin is warm and dry.      Capillary Refill: Capillary refill takes less than 2 seconds.      Coloration: Skin is not jaundiced.   Neurological:      General: No focal deficit present.      Mental Status: She is alert.   Psychiatric:         Mood and Affect: Mood normal.         ED Course         Procedures                   Results for orders placed or performed during the hospital encounter of 06/15/20 (from the past 24 hour(s))   CBC with platelets differential   Result Value Ref Range    WBC 8.8 4.0 - 11.0 10e9/L    RBC Count 4.93 3.8 - 5.2 10e12/L    Hemoglobin 15.5 11.7 - 15.7 g/dL    Hematocrit 45.0 35.0 - 47.0 %    MCV 91 78 - 100 fl    MCH 31.4 26.5 - 33.0 pg    MCHC 34.4 31.5 - 36.5 g/dL    RDW 12.2 10.0 - 15.0 %    Platelet Count 286 150 - 450 10e9/L    Diff Method Automated Method     % Neutrophils 52.1 %    % Lymphocytes 32.9 %    % Monocytes 7.6 %    % Eosinophils 5.9 %    % Basophils 0.8 %    % Immature Granulocytes 0.7 %    Nucleated RBCs 0 0 /100    Absolute Neutrophil 4.6 1.6 - 8.3 10e9/L    Absolute Lymphocytes 2.9 0.8 - 5.3 10e9/L    Absolute Monocytes 0.7 0.0 - 1.3 10e9/L    Absolute Basophils 0.1 0.0 - 0.2 10e9/L    Abs Immature Granulocytes 0.1 0 - 0.4 10e9/L    Absolute Nucleated RBC 0.0    Comprehensive metabolic panel   Result Value Ref Range    Sodium 140 133 - 144 mmol/L    Potassium 3.7 3.4 - 5.3 mmol/L    Chloride 109 94 - 109 mmol/L    Carbon Dioxide 25 20 - 32 mmol/L    Anion Gap 6 3 - 14 mmol/L    Glucose 105 (H) 70 - 99 mg/dL    Urea Nitrogen 19 7 - 30 mg/dL    Creatinine 0.79 0.52 - 1.04 mg/dL    GFR Estimate >90 >60 mL/min/[1.73_m2]    GFR Estimate If Black >90 >60 mL/min/[1.73_m2]    Calcium 8.5 8.5 - 10.1 mg/dL    Bilirubin Total 0.5 0.2 - 1.3 mg/dL    Albumin 4.2 3.4 - 5.0 g/dL    Protein Total 7.8 6.8 - 8.8 g/dL    Alkaline Phosphatase 87 40 - 150 U/L    ALT 32 0 - 50 U/L    AST 20 0 - 45 U/L   Lipase   Result Value Ref Range    Lipase 73 73 - 393 U/L   US Abdomen Limited (RUQ)    Narrative    ULTRASOUND ABDOMEN LIMITED 6/15/2020 9:38 AM    CLINICAL HISTORY: Postprandial discomfort/biliary colic type pain.    TECHNIQUE: Limited abdominal ultrasound.    COMPARISON: None.    FINDINGS:    GALLBLADDER: The gallbladder is normal. No gallstones, wall  thickening, or  pericholecystic fluid. Negative sonographic Qiu's  sign.    BILE DUCTS: There is no biliary dilatation. The common duct measures 3  mm.    LIVER: The liver is normal  in echogenicity without focal mass.     RIGHT KIDNEY: Unremarkable.    PANCREAS: The visualized portions of the pancreas are normal.    No ascites.      Impression    IMPRESSION: Normal limited abdominal ultrasound. No gallstones or bile  duct dilatation.    DARRELL DICKSON MD       Medications   0.9% sodium chloride BOLUS (1,000 mLs Intravenous New Bag 6/15/20 0839)     Followed by   sodium chloride 0.9% infusion (has no administration in time range)       Assessments & Plan (with Medical Decision Making)  9:08 AM    39 old female.  Obese.  10-day history for epigastric and right upper quadrant abdominal discomfort.  Postprandial trigger of all food types.  Symptoms associated with loose stools up to 10 watery-like stools per day.  No known history cholelithiasis, diverticulitis, colitis either infectious or inflammatory.  Has had no fever chills.  Denies any blood or mucus in stool.  11:51 AM  Medical work-up including CBC, CMP, lipase and upper quadrant ultrasound was normal.  Patient did not respond to GI cocktail.  With discomfort in the upper abdomen this was the diarrhea suspect this could be a colitis.  Stool testing is pending.  Schedule follow-up appointment in the clinic to over those test results.  If that is inconclusive then might need to consider colonoscopy to rule out inflammatory bowel disease.       I have reviewed the nursing notes.    I have reviewed the findings, diagnosis, plan and need for follow up with the patient.      New Prescriptions    No medications on file       Final diagnoses:   Diarrhea, unspecified type   Abdominal pain, generalized       6/15/2020   [facility] EMERGENCY DEPARTMENT     Seamus Bhatia,   06/15/20 0754     General

## 2022-10-30 ENCOUNTER — HEALTH MAINTENANCE LETTER (OUTPATIENT)
Age: 42
End: 2022-10-30

## 2023-04-08 ENCOUNTER — HEALTH MAINTENANCE LETTER (OUTPATIENT)
Age: 43
End: 2023-04-08

## 2023-09-03 ENCOUNTER — HEALTH MAINTENANCE LETTER (OUTPATIENT)
Age: 43
End: 2023-09-03

## 2024-05-29 NOTE — ED TRIAGE NOTES
Patient having epigastric and right upper abdominal pain with dark watery stools for 10 days.   
Develop coping skills.  For example, strategies and lifestyle changes that reduce negative moods, stress, and exposure to smoking cues.

## 2024-06-09 ENCOUNTER — HEALTH MAINTENANCE LETTER (OUTPATIENT)
Age: 44
End: 2024-06-09